# Patient Record
Sex: FEMALE | Race: WHITE | NOT HISPANIC OR LATINO | Employment: FULL TIME | URBAN - METROPOLITAN AREA
[De-identification: names, ages, dates, MRNs, and addresses within clinical notes are randomized per-mention and may not be internally consistent; named-entity substitution may affect disease eponyms.]

---

## 2017-05-10 ENCOUNTER — TRANSCRIBE ORDERS (OUTPATIENT)
Dept: ADMINISTRATIVE | Facility: HOSPITAL | Age: 72
End: 2017-05-10

## 2017-05-10 DIAGNOSIS — Z12.31 VISIT FOR SCREENING MAMMOGRAM: Primary | ICD-10-CM

## 2017-05-18 ENCOUNTER — HOSPITAL ENCOUNTER (OUTPATIENT)
Dept: MAMMOGRAPHY | Facility: HOSPITAL | Age: 72
Discharge: HOME | End: 2017-05-18
Admitting: INTERNAL MEDICINE

## 2017-05-18 DIAGNOSIS — Z12.31 VISIT FOR SCREENING MAMMOGRAM: ICD-10-CM

## 2017-05-18 PROCEDURE — 77063 BREAST TOMOSYNTHESIS BI: CPT

## 2017-05-18 PROCEDURE — G0202 SCR MAMMO BI INCL CAD: HCPCS | Performed by: RADIOLOGY

## 2017-05-18 PROCEDURE — G0202 SCR MAMMO BI INCL CAD: HCPCS

## 2017-05-18 PROCEDURE — 77063 BREAST TOMOSYNTHESIS BI: CPT | Performed by: RADIOLOGY

## 2018-03-05 ENCOUNTER — TRANSCRIBE ORDERS (OUTPATIENT)
Dept: ADMINISTRATIVE | Facility: HOSPITAL | Age: 73
End: 2018-03-05

## 2018-03-05 DIAGNOSIS — Z12.31 VISIT FOR SCREENING MAMMOGRAM: Primary | ICD-10-CM

## 2018-05-17 ENCOUNTER — OFFICE VISIT (OUTPATIENT)
Dept: NEUROSURGERY | Facility: CLINIC | Age: 73
End: 2018-05-17

## 2018-05-17 VITALS
HEIGHT: 59 IN | TEMPERATURE: 97.8 F | BODY MASS INDEX: 37.7 KG/M2 | WEIGHT: 187 LBS | DIASTOLIC BLOOD PRESSURE: 70 MMHG | SYSTOLIC BLOOD PRESSURE: 122 MMHG

## 2018-05-17 DIAGNOSIS — M48.062 SPINAL STENOSIS OF LUMBAR REGION WITH NEUROGENIC CLAUDICATION: ICD-10-CM

## 2018-05-17 DIAGNOSIS — M51.36 BULGING LUMBAR DISC: Primary | ICD-10-CM

## 2018-05-17 PROCEDURE — 99204 OFFICE O/P NEW MOD 45 MIN: CPT | Performed by: NEUROLOGICAL SURGERY

## 2018-05-17 RX ORDER — CARVEDILOL 3.12 MG/1
3.12 TABLET ORAL 2 TIMES DAILY WITH MEALS
COMMUNITY

## 2018-05-17 RX ORDER — DIPHENHYDRAMINE HCL 25 MG
25 CAPSULE ORAL EVERY 6 HOURS PRN
COMMUNITY

## 2018-05-17 RX ORDER — ASPIRIN 81 MG/1
81 TABLET ORAL DAILY
COMMUNITY
End: 2018-11-03 | Stop reason: HOSPADM

## 2018-05-17 RX ORDER — LEVOTHYROXINE SODIUM 0.1 MG/1
100 TABLET ORAL DAILY
COMMUNITY

## 2018-05-17 RX ORDER — HYDROCODONE BITARTRATE AND ACETAMINOPHEN 7.5; 325 MG/1; MG/1
1 TABLET ORAL EVERY 6 HOURS PRN
COMMUNITY
End: 2018-10-29

## 2018-05-17 RX ORDER — INSULIN LISPRO 100 [IU]/ML
20 INJECTION, SUSPENSION SUBCUTANEOUS EVERY MORNING
COMMUNITY

## 2018-05-17 RX ORDER — PRAVASTATIN SODIUM 40 MG
40 TABLET ORAL NIGHTLY
COMMUNITY

## 2018-05-17 RX ORDER — FUROSEMIDE 20 MG/1
20 TABLET ORAL DAILY
COMMUNITY

## 2018-05-17 RX ORDER — LISINOPRIL 2.5 MG/1
2.5 TABLET ORAL
COMMUNITY

## 2018-05-17 NOTE — PROGRESS NOTES
NAME: DANIELLE HARRIS   DOS: 2018  : 1945  PCP: Keshia Noe MD    Chief Complaint:  Back pain  Chief Complaint   Patient presents with   • Back Pain   • Leg Pain       History of Present Illness:  72 y.o. female   The very pleasant 72-year-old female well-known to me she had a recent fall about 2 months ago with history of back pain and bilateral abdominal symptomatology some of which is better but some which was made worse from physical therapy.  It's difficult to say if she is overall improved but she is not 100% better she reports some left-sided sciatic symptoms bilateral hip pain possibly of thoracic or lumbar pain and had intermittent incontinence during this but most of it was urgent incontinence she stated no evidence of cauda equina type of symptomatology she's here for evaluation    PMHX  Allergies:  Allergies   Allergen Reactions   • Atorvastatin Hives   • Norvasc  [Amlodipine Besylate] Other (See Comments)   • Insulin Nph Isophane & Regular Rash     Medications    Current Outpatient Prescriptions:   •  aspirin 81 MG EC tablet, Take 81 mg by mouth Daily., Disp: , Rfl:   •  carvedilol (COREG) 3.125 MG tablet, Take 3.125 mg by mouth 2 (Two) Times a Day With Meals., Disp: , Rfl:   •  diphenhydrAMINE (BENADRYL) 25 mg capsule, Take 25 mg by mouth Every 6 (Six) Hours As Needed for Itching., Disp: , Rfl:   •  furosemide (LASIX) 20 MG tablet, Take 20 mg by mouth 2 (Two) Times a Day., Disp: , Rfl:   •  HYDROcodone-acetaminophen (NORCO) 7.5-325 MG per tablet, Take 1 tablet by mouth Every 6 (Six) Hours As Needed for Moderate Pain ., Disp: , Rfl:   •  insulin lispro protamine-insulin lispro (humaLOG 50-50) (50-50) 100 UNIT/ML suspension injection, Inject  under the skin 2 (Two) Times a Day With Meals., Disp: , Rfl:   •  levothyroxine (SYNTHROID, LEVOTHROID) 100 MCG tablet, Take 100 mcg by mouth Daily., Disp: , Rfl:   •  lisinopril (PRINIVIL,ZESTRIL) 2.5 MG tablet, Take 2.5 mg by mouth Daily., Disp:  , Rfl:   •  pravastatin (PRAVACHOL) 40 MG tablet, Take 40 mg by mouth Daily., Disp: , Rfl:   Past Medical History:  Past Medical History:   Diagnosis Date   • Arthritis    • Asthma    • Diabetes mellitus    • History of transfusion    • Stroke      Past Surgical History:  Past Surgical History:   Procedure Laterality Date   • APPENDECTOMY     • CARPAL TUNNEL RELEASE     • CHOLECYSTECTOMY     • HYSTERECTOMY      AGE 54   • HYSTERECTOMY     • OOPHORECTOMY       Social Hx:  Social History   Substance Use Topics   • Smoking status: Former Smoker   • Smokeless tobacco: Never Used   • Alcohol use No     Family Hx:  Family History   Problem Relation Age of Onset   • Breast cancer Sister    • Ovarian cancer Sister    • Cancer Sister    • Cancer Mother    • No Known Problems Father      Review of Systems:        Review of Systems   Constitutional: Positive for activity change and fatigue. Negative for appetite change, chills, diaphoresis, fever and unexpected weight change.   HENT: Negative for congestion, dental problem, drooling, ear discharge, ear pain, facial swelling, hearing loss, mouth sores, nosebleeds, postnasal drip, rhinorrhea, sinus pressure, sneezing, sore throat, tinnitus, trouble swallowing and voice change.    Eyes: Negative for photophobia, pain, discharge, redness, itching and visual disturbance.   Respiratory: Negative for apnea, cough, choking, chest tightness, shortness of breath, wheezing and stridor.    Cardiovascular: Positive for leg swelling. Negative for chest pain and palpitations.   Gastrointestinal: Positive for abdominal pain. Negative for abdominal distention, anal bleeding, blood in stool, constipation, diarrhea, nausea, rectal pain and vomiting.   Endocrine: Negative for cold intolerance, heat intolerance, polydipsia, polyphagia and polyuria.   Genitourinary: Positive for urgency. Negative for decreased urine volume, difficulty urinating, dysuria, enuresis, flank pain, frequency, genital  sores and hematuria.   Musculoskeletal: Positive for arthralgias, back pain and myalgias. Negative for gait problem, joint swelling, neck pain and neck stiffness.   Skin: Negative for color change, pallor, rash and wound.   Allergic/Immunologic: Negative for environmental allergies, food allergies and immunocompromised state.   Neurological: Positive for weakness. Negative for dizziness, tremors, seizures, syncope, facial asymmetry, speech difficulty, light-headedness, numbness and headaches.   Hematological: Negative for adenopathy. Does not bruise/bleed easily.   Psychiatric/Behavioral: Negative for agitation, behavioral problems, confusion, decreased concentration, dysphoric mood, hallucinations, self-injury, sleep disturbance and suicidal ideas. The patient is not nervous/anxious and is not hyperactive.    I have reviewed this note template and all pertinent parts of the review of systems social, family history, surgical history and medication list      Physical Examination:  Vitals:    05/17/18 1014   BP: 122/70   Temp: 97.8 °F (36.6 °C)      General Appearance:   Well developed, well nourished, well groomed, alert, and cooperative.  Neurological examination:  Neurologic Exam  Vital signs were reviewed and documented in the chart  Patient appeared in good neurologic function with normal comprehension fluent speech  Mood and affect are normal  Sense of smell deferred    Pupils symmetric equally reactive funduscopic exam not visualized   Visual fields intact to confrontation  Extraocular movements intact  Face motor function is symmetric  Facial sensations normal  Hearing intact to finger rub hearing intact to finger rub  Tongue is midline  Palate symmetric  Swallowing normal  Shoulder shrug normal    Muscle bulk and tone normal  5 out of 5 strength no motor drift  Gait normal intact  Negative Romberg  No clonus long tract signs or myelopathy    Reflexes symmetric trace throughout  Mild edema noted and  extremities skin appears normal    Straight leg raise sign absent  No signs of intrinsic hip dysfunction she is tender at the knees bilaterally  Back is without any lesions or abnormality she has no percussive tenderness to speak of  Feet are warm and well perfused        Review of Imaging/DATA:  Reviewed a MRI of her lumbar spine that shows the presence of advanced degenerative changes at L4 5 there is a lot of Modicon plate changes there is a leftward disc herniation with moderate to severe lateral recess stenosis    There is also a T12 compression fracture likely that a subacute or some mild contouring of the cord up at the thoracic or lumbar area above this and multi levels of intraforaminal disease  Diagnoses/Plan:    Ms. Powell is a 72 y.o. female   Overall she has no signs of myelopathy she appears relatively comfortable she's having to take some intermittent opioids but otherwise is making her way she doesn't have flagrant radiculopathy from her desk I think she has 2 things going on I think her subacute T12 compression fracture must be improving she has no percussive tenderness she has a lot of anterior abdominal discomfort but it's difficult to ascertain whether or not that is from her injuries.  Additionally I don't like the marrow signal change in her back but again that will have to be monitored    From my standpoint I did offer a biopsy and T12 compression fracture surgery with kyphoplasty as well as lumbar discectomy at left L4 5 there may expedite her return to function    She did not have any symptoms of neurogenic claudication before her fall or a monitor her though given her modest improvement and we'll see her back with a set of lumbar x-rays

## 2018-05-21 ENCOUNTER — HOSPITAL ENCOUNTER (OUTPATIENT)
Dept: MAMMOGRAPHY | Facility: HOSPITAL | Age: 73
Discharge: HOME | End: 2018-05-21
Admitting: INTERNAL MEDICINE

## 2018-05-21 DIAGNOSIS — Z12.31 VISIT FOR SCREENING MAMMOGRAM: ICD-10-CM

## 2018-05-21 PROCEDURE — 77067 SCR MAMMO BI INCL CAD: CPT

## 2018-05-21 PROCEDURE — 77063 BREAST TOMOSYNTHESIS BI: CPT | Performed by: RADIOLOGY

## 2018-05-21 PROCEDURE — 77063 BREAST TOMOSYNTHESIS BI: CPT

## 2018-05-21 PROCEDURE — 77067 SCR MAMMO BI INCL CAD: CPT | Performed by: RADIOLOGY

## 2018-06-22 ENCOUNTER — HOSPITAL ENCOUNTER (OUTPATIENT)
Dept: GENERAL RADIOLOGY | Facility: HOSPITAL | Age: 73
Discharge: HOME | End: 2018-06-22
Attending: NEUROLOGICAL SURGERY | Admitting: NEUROLOGICAL SURGERY

## 2018-06-22 DIAGNOSIS — M48.062 SPINAL STENOSIS OF LUMBAR REGION WITH NEUROGENIC CLAUDICATION: ICD-10-CM

## 2018-06-22 DIAGNOSIS — M51.36 BULGING LUMBAR DISC: ICD-10-CM

## 2018-06-22 PROCEDURE — 72120 X-RAY BEND ONLY L-S SPINE: CPT

## 2018-06-28 ENCOUNTER — OFFICE VISIT (OUTPATIENT)
Dept: NEUROSURGERY | Facility: CLINIC | Age: 73
End: 2018-06-28

## 2018-06-28 VITALS
WEIGHT: 187 LBS | BODY MASS INDEX: 37.7 KG/M2 | DIASTOLIC BLOOD PRESSURE: 74 MMHG | SYSTOLIC BLOOD PRESSURE: 128 MMHG | TEMPERATURE: 97.8 F | HEIGHT: 59 IN

## 2018-06-28 DIAGNOSIS — M51.26 HNP (HERNIATED NUCLEUS PULPOSUS), LUMBAR: Primary | ICD-10-CM

## 2018-06-28 DIAGNOSIS — M48.062 SPINAL STENOSIS OF LUMBAR REGION WITH NEUROGENIC CLAUDICATION: ICD-10-CM

## 2018-06-28 PROCEDURE — 99214 OFFICE O/P EST MOD 30 MIN: CPT | Performed by: NEUROLOGICAL SURGERY

## 2018-06-28 NOTE — PROGRESS NOTES
NAME: DANIELLE HARRIS   DOS: 2018  : 1945  PCP: Keshia Noe MD    Chief Complaint:    Chief Complaint   Patient presents with   • Back Pain   • Leg Pain       History of Present Illness:  72 y.o. female   The very pleasant 72-year-old female well-known to me she had a recent fall about 2 months ago with history of back pain and bilateral abdominal symptomatology some of which is better but some which was made worse from physical therapy.  It's difficult to say if she is overall improved but she is not 100% better she reports some left-sided sciatic symptoms bilateral hip pain possibly of thoracic or lumbar pain and had intermittent incontinence during this but most of it was urgent incontinence she stated no evidence of cauda equina type of symptomatology she's here for evaluation       She's been participating in physical therapy and still have some symptoms of left neurogenic claudication predominantly in the left buttock hip and leg a lot of her symptoms however are circumferential referrable to the T11 area and she has a history of abdominal symptomatology as well she does occasionally have right-sided symptoms.  She denies any bowel bladder incontinence issues that are new for her some of her pain is better it's difficult to ascertain the degree of symptoms that she has regarding her left lower extremity as far as intensity she still is caring for her .    PMHX  Allergies:  Allergies   Allergen Reactions   • Atorvastatin Hives   • Norvasc  [Amlodipine Besylate] Other (See Comments)   • Insulin Nph Isophane & Regular Rash     Medications    Current Outpatient Prescriptions:   •  aspirin 81 MG EC tablet, Take 81 mg by mouth Daily., Disp: , Rfl:   •  carvedilol (COREG) 3.125 MG tablet, Take 3.125 mg by mouth 2 (Two) Times a Day With Meals., Disp: , Rfl:   •  diphenhydrAMINE (BENADRYL) 25 mg capsule, Take 25 mg by mouth Every 6 (Six) Hours As Needed for Itching., Disp: , Rfl:   •   furosemide (LASIX) 20 MG tablet, Take 20 mg by mouth 2 (Two) Times a Day., Disp: , Rfl:   •  HYDROcodone-acetaminophen (NORCO) 7.5-325 MG per tablet, Take 1 tablet by mouth Every 6 (Six) Hours As Needed for Moderate Pain ., Disp: , Rfl:   •  insulin lispro protamine-insulin lispro (humaLOG 50-50) (50-50) 100 UNIT/ML suspension injection, Inject  under the skin 2 (Two) Times a Day With Meals., Disp: , Rfl:   •  levothyroxine (SYNTHROID, LEVOTHROID) 100 MCG tablet, Take 100 mcg by mouth Daily., Disp: , Rfl:   •  lisinopril (PRINIVIL,ZESTRIL) 2.5 MG tablet, Take 2.5 mg by mouth Daily., Disp: , Rfl:   •  pravastatin (PRAVACHOL) 40 MG tablet, Take 40 mg by mouth Daily., Disp: , Rfl:   Past Medical History:  Past Medical History:   Diagnosis Date   • Arthritis    • Asthma    • Diabetes mellitus    • History of transfusion    • Stroke      Past Surgical History:  Past Surgical History:   Procedure Laterality Date   • APPENDECTOMY     • CARPAL TUNNEL RELEASE     • CHOLECYSTECTOMY     • HYSTERECTOMY      AGE 54   • HYSTERECTOMY     • OOPHORECTOMY       Social Hx:  Social History   Substance Use Topics   • Smoking status: Former Smoker   • Smokeless tobacco: Never Used   • Alcohol use No     Family Hx:  Family History   Problem Relation Age of Onset   • Breast cancer Sister 30   • Ovarian cancer Sister    • Cancer Sister    • Cancer Mother    • No Known Problems Father      Review of Systems:        Review of Systems   Constitutional: Positive for activity change and fatigue. Negative for appetite change, chills, diaphoresis, fever and unexpected weight change.   HENT: Negative for congestion, dental problem, drooling, ear discharge, ear pain, facial swelling, hearing loss, mouth sores, nosebleeds, postnasal drip, rhinorrhea, sinus pressure, sneezing, sore throat, tinnitus, trouble swallowing and voice change.    Eyes: Negative for photophobia, pain, discharge, redness, itching and visual disturbance.   Respiratory: Negative  for apnea, cough, choking, chest tightness, shortness of breath, wheezing and stridor.    Cardiovascular: Positive for leg swelling. Negative for chest pain and palpitations.   Gastrointestinal: Positive for abdominal pain. Negative for abdominal distention, anal bleeding, blood in stool, constipation, diarrhea, nausea, rectal pain and vomiting.   Endocrine: Negative for cold intolerance, heat intolerance, polydipsia, polyphagia and polyuria.   Genitourinary: Positive for urgency. Negative for decreased urine volume, difficulty urinating, dysuria, enuresis, flank pain, frequency, genital sores and hematuria.   Musculoskeletal: Positive for arthralgias, back pain and myalgias. Negative for gait problem, joint swelling, neck pain and neck stiffness.   Skin: Negative for color change, pallor, rash and wound.   Allergic/Immunologic: Negative for environmental allergies, food allergies and immunocompromised state.   Neurological: Positive for weakness. Negative for dizziness, tremors, seizures, syncope, facial asymmetry, speech difficulty, light-headedness, numbness and headaches.   Hematological: Negative for adenopathy. Does not bruise/bleed easily.   Psychiatric/Behavioral: Negative for agitation, behavioral problems, confusion, decreased concentration, dysphoric mood, hallucinations, self-injury, sleep disturbance and suicidal ideas. The patient is not nervous/anxious and is not hyperactive.         I have reviewed this note template and all pertinent parts of the review of systems social, family history, surgical history and medication list    Physical Examination:  Vitals:    06/28/18 0947   BP: 128/74   Temp: 97.8 °F (36.6 °C)      General Appearance:   Well developed, well nourished, well groomed, alert, and cooperative.  Neurological examination:  Neurologic Exam  Vital signs were reviewed and documented in the chart  Patient appeared in good neurologic function with normal comprehension fluent speech  Mood and  affect are normal  Sense of smell deferred    Pupils symmetric equally reactive funduscopic exam not visualized   Visual fields intact to confrontation  Extraocular movements intact  Face motor function is symmetric  Facial sensations normal  Hearing intact to finger rub hearing intact to finger rub  Tongue is midline  Palate symmetric  Swallowing normal  Shoulder shrug normal    Muscle bulk and tone normal  5 out of 5 strength no motor drift  Gait normal intact  Negative Romberg  No clonus long tract signs or myelopathy    Reflexes symmetric trace at the knees and ankles  No edema noted and extremities skin appears normal    Straight leg raise sign absent  Mild signs of intrinsic hip dysfunction  Back is without any lesions or abnormality  Feet are warm and well perfused  Her gait is wide-based and stable she can toe and heel walk      Review of Imaging/DATA:  I reviewed her MRI again she's got multilevel degenerative disc disease she has what I would characterize as moderate to severe left lateral recess stenosis at the L4 5 area additionally there is intraforaminal component of her disease and she has lesser disease at the 34 level her x-rays are relatively unremarkable her compression fracture seems stable  Diagnoses/Plan:    Ms. Powell is a 72 y.o. female   From my standpoint I think she still struggling it's difficult to ascertain how much of this is general loss of mobility from her complicated hip buttock knee arthritic issues are how much of it is strictly from her sciatica.  Had a nice discussion with her I think she is a candidate for an L4 5 lamina foraminotomy I do think she needs to follow-up with Dr. Huang regarding this abdominal pain is to sorted out and I think she's even may be a bit interested in getting a CT of her abdominal area at some point which she is to follow-up with her primary care doctor about.    As far as her compression fracture she doesn't really have percussive tenderness but  given the circumstantial nature of some of her upper pain it may not be unreasonable to obtain a bone scan which I'll follow-up on him in a set her up a follow-up if her bone scans hot we'll consider it kyphoplasty would lamina foraminotomy if her bone scan is not we can contemplate a lamina foraminotomy alone    Additionally I also think she should make arrangements with Dr. ochoa for a lumbar epidural steroid block to see if that will help.  It's been a pleasure to provide neurosurgical care

## 2018-07-13 ENCOUNTER — HOSPITAL ENCOUNTER (OUTPATIENT)
Dept: NUCLEAR MEDICINE | Facility: HOSPITAL | Age: 73
Discharge: HOME | End: 2018-07-13
Attending: NEUROLOGICAL SURGERY

## 2018-07-13 DIAGNOSIS — M48.062 SPINAL STENOSIS OF LUMBAR REGION WITH NEUROGENIC CLAUDICATION: ICD-10-CM

## 2018-07-13 DIAGNOSIS — M51.26 HNP (HERNIATED NUCLEUS PULPOSUS), LUMBAR: ICD-10-CM

## 2018-07-13 PROCEDURE — A9503 TC99M MEDRONATE: HCPCS | Performed by: NEUROLOGICAL SURGERY

## 2018-07-13 PROCEDURE — 78306 BONE IMAGING WHOLE BODY: CPT

## 2018-07-13 PROCEDURE — 0 TECHNETIUM MEDRONATE KIT: Performed by: NEUROLOGICAL SURGERY

## 2018-07-13 RX ORDER — TC 99M MEDRONATE 20 MG/10ML
25.5 INJECTION, POWDER, LYOPHILIZED, FOR SOLUTION INTRAVENOUS
Status: COMPLETED | OUTPATIENT
Start: 2018-07-13 | End: 2018-07-13

## 2018-07-13 RX ADMIN — Medication 25.5 MILLICURIE: at 08:10

## 2018-07-26 ENCOUNTER — OFFICE VISIT (OUTPATIENT)
Dept: NEUROSURGERY | Facility: CLINIC | Age: 73
End: 2018-07-26

## 2018-07-26 VITALS
TEMPERATURE: 98.2 F | DIASTOLIC BLOOD PRESSURE: 68 MMHG | HEIGHT: 59 IN | WEIGHT: 189 LBS | SYSTOLIC BLOOD PRESSURE: 138 MMHG | BODY MASS INDEX: 38.1 KG/M2

## 2018-07-26 DIAGNOSIS — M51.26 HNP (HERNIATED NUCLEUS PULPOSUS), LUMBAR: Primary | ICD-10-CM

## 2018-07-26 DIAGNOSIS — M51.36 DDD (DEGENERATIVE DISC DISEASE), LUMBAR: ICD-10-CM

## 2018-07-26 DIAGNOSIS — M48.062 SPINAL STENOSIS OF LUMBAR REGION WITH NEUROGENIC CLAUDICATION: Primary | ICD-10-CM

## 2018-07-26 PROCEDURE — 99213 OFFICE O/P EST LOW 20 MIN: CPT | Performed by: PHYSICIAN ASSISTANT

## 2018-07-26 NOTE — PROGRESS NOTES
Patient: Amy Powell  : 1945    Primary Care Provider: Keshia Noe MD      Chief Complaint: Low back pain, left leg sciatica    History of Present Illness:       Dr. Alcazar has seen patient recently with an MRI of the lumbar spine that showed moderate to severe foraminal stenosis at left L4 5.  He thought that this could possibly lead itself to surgery.  Patient also has a T12 compression fracture that he ordered a bone scan on.  Bone scan came out positive for a acute fracture at T12.  Patient has had this pain since March and this seems indicative of pathologic motion in the fracture segment.    Patient is here today to discuss options.  Unfortunately, Dr. Alcazar is an emergency procedure, and is unable to discuss with him at this time, but I discussed with patient.  Kyphoplasty as well as a lumbar lamina foraminotomies for the foraminal stenosis both of which have been explained and we're going to have Dr. Alcazar call the patient with ongoing plan.  Patient is comfortable with this and daughter is a nurse practitioner is also comfortable with this.      Review of Systems   Constitutional: Positive for activity change and fatigue. Negative for appetite change, chills, diaphoresis, fever and unexpected weight change.   HENT: Negative for congestion, dental problem, drooling, ear discharge, ear pain, facial swelling, hearing loss, mouth sores, nosebleeds, postnasal drip, rhinorrhea, sinus pressure, sneezing, sore throat, tinnitus, trouble swallowing and voice change.    Eyes: Negative for photophobia, pain, discharge, redness, itching and visual disturbance.   Respiratory: Negative for apnea, cough, choking, chest tightness, shortness of breath, wheezing and stridor.    Cardiovascular: Positive for leg swelling. Negative for chest pain and palpitations.   Gastrointestinal: Positive for abdominal pain. Negative for abdominal distention, anal bleeding, blood in stool, constipation, diarrhea, nausea,  rectal pain and vomiting.   Endocrine: Negative for cold intolerance, heat intolerance, polydipsia, polyphagia and polyuria.   Genitourinary: Positive for urgency. Negative for decreased urine volume, difficulty urinating, dysuria, enuresis, flank pain, frequency, genital sores and hematuria.   Musculoskeletal: Positive for arthralgias, back pain and myalgias. Negative for gait problem, joint swelling, neck pain and neck stiffness.   Skin: Negative for color change, pallor, rash and wound.   Allergic/Immunologic: Negative for environmental allergies, food allergies and immunocompromised state.   Neurological: Positive for weakness. Negative for dizziness, tremors, seizures, syncope, facial asymmetry, speech difficulty, light-headedness, numbness and headaches.   Hematological: Negative for adenopathy. Does not bruise/bleed easily.   Psychiatric/Behavioral: Negative for agitation, behavioral problems, confusion, decreased concentration, dysphoric mood, hallucinations, self-injury, sleep disturbance and suicidal ideas. The patient is not nervous/anxious and is not hyperactive.        Past Medical History:     Past Medical History:   Diagnosis Date   • Arthritis    • Asthma    • Diabetes mellitus (CMS/HCC)    • History of transfusion    • Stroke (CMS/HCC)        Family History:     Family History   Problem Relation Age of Onset   • Breast cancer Sister 30   • Ovarian cancer Sister    • Cancer Sister    • Cancer Mother    • No Known Problems Father        Social History:    reports that she has quit smoking. She has never used smokeless tobacco. She reports that she does not drink alcohol or use drugs.   SMOKING STATUS: Nonsmoker    Surgical History:     Past Surgical History:   Procedure Laterality Date   • APPENDECTOMY     • CARPAL TUNNEL RELEASE     • CHOLECYSTECTOMY     • HYSTERECTOMY      AGE 54   • HYSTERECTOMY     • OOPHORECTOMY         Allergies:   Atorvastatin; Norvasc  [amlodipine besylate]; and Insulin nph  "isophane & regular    Physical Exam:    Vital Signs:/68 (BP Location: Right arm, Patient Position: Sitting)   Temp 98.2 °F (36.8 °C) (Temporal Artery )   Ht 149.9 cm (59\")   Wt 85.7 kg (189 lb)   BMI 38.17 kg/m²    BMI: Body mass index is 38.17 kg/m².    Vital signs were reviewed and documented in the chart  Patient appeared in good neurologic function with normal comprehension fluent speech  Mood and affect are normal  Sense of smell deferred     Pupils symmetric equally reactive funduscopic exam not visualized   Visual fields intact to confrontation  Extraocular movements intact  Face motor function is symmetric  Facial sensations normal  Hearing intact to finger rub hearing intact to finger rub  Tongue is midline  Palate symmetric  Swallowing normal  Shoulder shrug normal     Muscle bulk and tone normal  5 out of 5 strength no motor drift  Gait normal intact  Negative Romberg  No clonus long tract signs or myelopathy     Reflexes symmetric trace at the knees and ankles  No edema noted and extremities skin appears normal     Straight leg raise sign absent  Mild signs of intrinsic hip dysfunction  Back is without any lesions or abnormality  Feet are warm and well perfused  Her gait is wide-based and stable she can toe and heel walk    Medical Decision Making    Data Review:   Bone scan was reviewed and showed a hot spot at T12    Diagnosis:   T12 compression fracture  Left L4 5 foraminal stenosis    Treatment Options:   Dr. Alcazar is going to call the patient with further ongoing plan.  Patient is tried pain management, physical therapy and I'll conservative medicines with no relief.    It has been a pleasure providing neurosurgical care.    Nathan Cabral PA-C      No diagnosis found.  "

## 2018-08-02 ENCOUNTER — APPOINTMENT (OUTPATIENT)
Dept: MRI IMAGING | Facility: HOSPITAL | Age: 73
End: 2018-08-02
Attending: NEUROLOGICAL SURGERY

## 2018-08-07 ENCOUNTER — HOSPITAL ENCOUNTER (OUTPATIENT)
Dept: MRI IMAGING | Facility: HOSPITAL | Age: 73
Discharge: HOME | End: 2018-08-07
Attending: NEUROLOGICAL SURGERY | Admitting: NEUROLOGICAL SURGERY

## 2018-08-07 ENCOUNTER — HOSPITAL ENCOUNTER (OUTPATIENT)
Dept: GENERAL RADIOLOGY | Facility: HOSPITAL | Age: 73
Discharge: HOME | End: 2018-08-07
Attending: NEUROLOGICAL SURGERY

## 2018-08-07 DIAGNOSIS — M51.36 DDD (DEGENERATIVE DISC DISEASE), LUMBAR: ICD-10-CM

## 2018-08-07 DIAGNOSIS — M51.26 HNP (HERNIATED NUCLEUS PULPOSUS), LUMBAR: ICD-10-CM

## 2018-08-07 PROCEDURE — 72158 MRI LUMBAR SPINE W/O & W/DYE: CPT

## 2018-08-07 PROCEDURE — 0 GADOBENATE DIMEGLUMINE 529 MG/ML SOLUTION: Performed by: NEUROLOGICAL SURGERY

## 2018-08-07 PROCEDURE — 72120 X-RAY BEND ONLY L-S SPINE: CPT

## 2018-08-07 PROCEDURE — 82565 ASSAY OF CREATININE: CPT

## 2018-08-07 PROCEDURE — A9577 INJ MULTIHANCE: HCPCS | Performed by: NEUROLOGICAL SURGERY

## 2018-08-07 RX ADMIN — GADOBENATE DIMEGLUMINE 16 ML: 529 INJECTION, SOLUTION INTRAVENOUS at 09:52

## 2018-08-08 LAB — CREAT BLDA-MCNC: 1 MG/DL (ref 0.6–1.3)

## 2018-08-13 ENCOUNTER — PREP FOR SURGERY (OUTPATIENT)
Dept: OTHER | Facility: HOSPITAL | Age: 73
End: 2018-08-13

## 2018-08-13 ENCOUNTER — OFFICE VISIT (OUTPATIENT)
Dept: NEUROSURGERY | Facility: CLINIC | Age: 73
End: 2018-08-13

## 2018-08-13 VITALS — OXYGEN SATURATION: 98 % | BODY MASS INDEX: 37.9 KG/M2 | WEIGHT: 188 LBS | RESPIRATION RATE: 18 BRPM | HEIGHT: 59 IN

## 2018-08-13 DIAGNOSIS — S22.080A T12 COMPRESSION FRACTURE (HCC): Primary | ICD-10-CM

## 2018-08-13 DIAGNOSIS — M48.062 SPINAL STENOSIS, LUMBAR REGION, WITH NEUROGENIC CLAUDICATION: Primary | ICD-10-CM

## 2018-08-13 DIAGNOSIS — Z41.9 SURGERY, ELECTIVE: ICD-10-CM

## 2018-08-13 DIAGNOSIS — M51.36 DDD (DEGENERATIVE DISC DISEASE), LUMBAR: ICD-10-CM

## 2018-08-13 DIAGNOSIS — M51.26 HNP (HERNIATED NUCLEUS PULPOSUS), LUMBAR: ICD-10-CM

## 2018-08-13 PROCEDURE — 99214 OFFICE O/P EST MOD 30 MIN: CPT | Performed by: NEUROLOGICAL SURGERY

## 2018-08-13 RX ORDER — FAMOTIDINE 20 MG/1
20 TABLET, FILM COATED ORAL
Status: CANCELLED | OUTPATIENT
Start: 2018-08-13

## 2018-08-13 RX ORDER — CHLORHEXIDINE GLUCONATE 4 G/100ML
SOLUTION TOPICAL
Qty: 120 ML | Refills: 0 | Status: ON HOLD | OUTPATIENT
Start: 2018-08-13 | End: 2018-11-02

## 2018-08-13 RX ORDER — SODIUM CHLORIDE 0.9 % (FLUSH) 0.9 %
1-10 SYRINGE (ML) INJECTION AS NEEDED
Status: CANCELLED | OUTPATIENT
Start: 2018-08-13

## 2018-08-13 RX ORDER — HYDROCODONE BITARTRATE AND ACETAMINOPHEN 7.5; 325 MG/1; MG/1
1 TABLET ORAL ONCE
Status: CANCELLED | OUTPATIENT
Start: 2018-08-13 | End: 2018-08-13

## 2018-08-13 RX ORDER — CEFAZOLIN SODIUM 2 G/100ML
2 INJECTION, SOLUTION INTRAVENOUS ONCE
Status: CANCELLED | OUTPATIENT
Start: 2018-08-13 | End: 2018-08-13

## 2018-08-13 RX ORDER — IBUPROFEN 800 MG/1
800 TABLET ORAL ONCE
Status: CANCELLED | OUTPATIENT
Start: 2018-08-13 | End: 2018-08-13

## 2018-08-13 RX ORDER — ACETAMINOPHEN 325 MG/1
650 TABLET ORAL ONCE
Status: CANCELLED | OUTPATIENT
Start: 2018-08-13 | End: 2018-08-13

## 2018-08-13 NOTE — PROGRESS NOTES
NAME: DANIELLE HARRIS   DOS: 2018  : 1945  PCP: Keshia Noe MD    Chief Complaint:    Chief Complaint   Patient presents with   • Back Pain       History of Present Illness:  73 y.o. female   The very pleasant 72-year-old female well-known to me she had a recent fall about 2 months ago with history of back pain and bilateral abdominal symptomatology some of which is better but some which was made worse from physical therapy.  It's difficult to say if she is overall improved but she is not 100% better she reports some left-sided sciatic symptoms bilateral hip pain possibly of thoracic or lumbar pain and had intermittent incontinence during this but most of it was urgent incontinence she stated no evidence of cauda equina type of symptomatology she's here for evaluation        She's been participating in physical therapy and still have some symptoms of left neurogenic claudication predominantly in the left buttock hip and leg a lot of her symptoms however are circumferential referrable to the T11 area and she has a history of abdominal symptomatology as well she does occasionally have right-sided symptoms.  She denies any bowel bladder incontinence issues that are new for her some of her pain is better it's difficult to ascertain the degree of symptoms that she has regarding her left lower extremity as far as intensity she still is caring for her .    She is still no better she denies any bowel bladder incontinence issues she has predominantly left leg but some right hip pain    PMHX  Allergies:  Allergies   Allergen Reactions   • Atorvastatin Hives   • Norvasc  [Amlodipine Besylate] Other (See Comments)   • Insulin Nph Isophane & Regular Rash     Medications    Current Outpatient Prescriptions:   •  aspirin 81 MG EC tablet, Take 81 mg by mouth Daily., Disp: , Rfl:   •  carvedilol (COREG) 3.125 MG tablet, Take 3.125 mg by mouth 2 (Two) Times a Day With Meals., Disp: , Rfl:   •   diphenhydrAMINE (BENADRYL) 25 mg capsule, Take 25 mg by mouth Every 6 (Six) Hours As Needed for Itching., Disp: , Rfl:   •  furosemide (LASIX) 20 MG tablet, Take 20 mg by mouth 2 (Two) Times a Day., Disp: , Rfl:   •  HYDROcodone-acetaminophen (NORCO) 7.5-325 MG per tablet, Take 1 tablet by mouth Every 6 (Six) Hours As Needed for Moderate Pain ., Disp: , Rfl:   •  insulin lispro protamine-insulin lispro (humaLOG 50-50) (50-50) 100 UNIT/ML suspension injection, Inject  under the skin 2 (Two) Times a Day With Meals., Disp: , Rfl:   •  levothyroxine (SYNTHROID, LEVOTHROID) 100 MCG tablet, Take 100 mcg by mouth Daily., Disp: , Rfl:   •  lisinopril (PRINIVIL,ZESTRIL) 2.5 MG tablet, Take 2.5 mg by mouth Daily., Disp: , Rfl:   •  pravastatin (PRAVACHOL) 40 MG tablet, Take 40 mg by mouth Daily., Disp: , Rfl:   Past Medical History:  Past Medical History:   Diagnosis Date   • Arthritis    • Asthma    • Diabetes mellitus (CMS/HCC)    • History of transfusion    • Stroke (CMS/HCC)      Past Surgical History:  Past Surgical History:   Procedure Laterality Date   • APPENDECTOMY     • CARPAL TUNNEL RELEASE     • CHOLECYSTECTOMY     • HYSTERECTOMY      AGE 54   • HYSTERECTOMY     • OOPHORECTOMY       Social Hx:  Social History   Substance Use Topics   • Smoking status: Former Smoker   • Smokeless tobacco: Never Used   • Alcohol use No     Family Hx:  Family History   Problem Relation Age of Onset   • Breast cancer Sister 30   • Ovarian cancer Sister    • Cancer Sister    • Cancer Mother    • No Known Problems Father      Review of Systems:        Review of Systems   Constitutional: Negative for activity change, appetite change, chills, diaphoresis, fatigue, fever and unexpected weight change.   HENT: Negative for congestion, dental problem, drooling, ear discharge, ear pain, facial swelling, hearing loss, mouth sores, nosebleeds, postnasal drip, rhinorrhea, sinus pressure, sneezing, sore throat, tinnitus, trouble swallowing and  voice change.    Eyes: Negative for photophobia, pain, discharge, redness, itching and visual disturbance.   Respiratory: Negative for apnea, cough, choking, chest tightness, shortness of breath, wheezing and stridor.    Cardiovascular: Negative for chest pain, palpitations and leg swelling.   Gastrointestinal: Negative for abdominal distention, abdominal pain, anal bleeding, blood in stool, constipation, diarrhea, nausea, rectal pain and vomiting.   Endocrine: Negative for cold intolerance, heat intolerance, polydipsia, polyphagia and polyuria.   Genitourinary: Negative for decreased urine volume, difficulty urinating, dysuria, enuresis, flank pain, frequency, genital sores, hematuria and urgency.   Musculoskeletal: Positive for arthralgias, back pain and myalgias. Negative for gait problem, joint swelling, neck pain and neck stiffness.   Skin: Negative for color change, pallor, rash and wound.   Allergic/Immunologic: Negative for environmental allergies, food allergies and immunocompromised state.   Neurological: Negative for dizziness, tremors, seizures, syncope, facial asymmetry, speech difficulty, weakness, light-headedness, numbness and headaches.   Hematological: Negative for adenopathy. Does not bruise/bleed easily.   Psychiatric/Behavioral: Negative for agitation, behavioral problems, confusion, decreased concentration, dysphoric mood, hallucinations, self-injury, sleep disturbance and suicidal ideas. The patient is not nervous/anxious and is not hyperactive.    All other systems reviewed and are negative.       I have reviewed this note template and all pertinent parts of the review of systems social, family history, surgical history and medication list    Physical Examination:  Vitals:    08/13/18 1257   Resp: 18   SpO2: 98%      General Appearance:   Well developed, well nourished, well groomed, alert, and cooperative.  Neurological examination:  Neurologic Exam  She is awake alert and follows  commands    Cranial nerves intact    No motor drift good strength upper extremities no long track signs    No suspended thoracic sensory level    Good strength lower extremities proximally can toe and heel walk    Review of Imaging/DATA:  Reviewed her MRI therefore things essentially going on her old MRI was cut off at the T9 10 area it shows a modest disc herniation with osteophyte complex posteriorly the spinal cord is deflected but without significant signal change it could however be a nidus from one her fall occurred that she may have bruised her spinal cord    I would treat this nonsurgically right now    She has a T12 compression fracture    She has severe lateral recess stenosis L3 4 L4 5 with intraforaminal disease on the left  Diagnoses/Plan:    Ms. Powell is a 73 y.o. female   She is very complex mobility issues of recommended surgery I like to get a T12 kyphoplasty performed I expand the risks benefits of this I also think it's prudent to do a left-sided approach L3 4 L4 5 laminectomy and lamina foraminotomy discectomy she has a lot of intraforaminal disease this may not work in the sense that she may require a fusion but it represents a minimally invasive option.    I would monitor the T9 10 disc disease issue if all these other options fail we can reimage this and consider appropriate treatment.

## 2018-08-29 PROBLEM — M48.062 SPINAL STENOSIS, LUMBAR REGION, WITH NEUROGENIC CLAUDICATION: Status: ACTIVE | Noted: 2018-08-29

## 2018-10-05 ENCOUNTER — APPOINTMENT (OUTPATIENT)
Dept: PREADMISSION TESTING | Facility: HOSPITAL | Age: 73
End: 2018-10-05

## 2018-10-29 ENCOUNTER — APPOINTMENT (OUTPATIENT)
Dept: PREADMISSION TESTING | Facility: HOSPITAL | Age: 73
End: 2018-10-29

## 2018-10-29 VITALS — WEIGHT: 188.49 LBS | HEIGHT: 59 IN | BODY MASS INDEX: 38 KG/M2

## 2018-10-29 DIAGNOSIS — M48.062 SPINAL STENOSIS, LUMBAR REGION, WITH NEUROGENIC CLAUDICATION: ICD-10-CM

## 2018-10-29 LAB
ANION GAP SERPL CALCULATED.3IONS-SCNC: 7 MMOL/L (ref 3–11)
BUN BLD-MCNC: 15 MG/DL (ref 9–23)
BUN/CREAT SERPL: 16 (ref 7–25)
CALCIUM SPEC-SCNC: 8.9 MG/DL (ref 8.7–10.4)
CHLORIDE SERPL-SCNC: 106 MMOL/L (ref 99–109)
CO2 SERPL-SCNC: 28 MMOL/L (ref 20–31)
CREAT BLD-MCNC: 0.94 MG/DL (ref 0.6–1.3)
DEPRECATED RDW RBC AUTO: 47.3 FL (ref 37–54)
ERYTHROCYTE [DISTWIDTH] IN BLOOD BY AUTOMATED COUNT: 14 % (ref 11.3–14.5)
GFR SERPL CREATININE-BSD FRML MDRD: 59 ML/MIN/1.73
GLUCOSE BLD-MCNC: 198 MG/DL (ref 70–100)
HBA1C MFR BLD: 7.6 % (ref 4.8–5.6)
HCT VFR BLD AUTO: 37.7 % (ref 34.5–44)
HGB BLD-MCNC: 12.3 G/DL (ref 11.5–15.5)
MCH RBC QN AUTO: 30.4 PG (ref 27–31)
MCHC RBC AUTO-ENTMCNC: 32.6 G/DL (ref 32–36)
MCV RBC AUTO: 93.1 FL (ref 80–99)
PLATELET # BLD AUTO: 192 10*3/MM3 (ref 150–450)
PMV BLD AUTO: 10.3 FL (ref 6–12)
POTASSIUM BLD-SCNC: 3.7 MMOL/L (ref 3.5–5.5)
RBC # BLD AUTO: 4.05 10*6/MM3 (ref 3.89–5.14)
SODIUM BLD-SCNC: 141 MMOL/L (ref 132–146)
WBC NRBC COR # BLD: 7.57 10*3/MM3 (ref 3.5–10.8)

## 2018-10-29 PROCEDURE — 87081 CULTURE SCREEN ONLY: CPT | Performed by: ANESTHESIOLOGY

## 2018-10-29 PROCEDURE — 83036 HEMOGLOBIN GLYCOSYLATED A1C: CPT | Performed by: ANESTHESIOLOGY

## 2018-10-29 PROCEDURE — 80048 BASIC METABOLIC PNL TOTAL CA: CPT | Performed by: NEUROLOGICAL SURGERY

## 2018-10-29 PROCEDURE — 36415 COLL VENOUS BLD VENIPUNCTURE: CPT

## 2018-10-29 PROCEDURE — 85027 COMPLETE CBC AUTOMATED: CPT | Performed by: ANESTHESIOLOGY

## 2018-10-29 RX ORDER — INSULIN LISPRO 100 [IU]/ML
30 INJECTION, SUSPENSION SUBCUTANEOUS EVERY EVENING
COMMUNITY

## 2018-10-31 LAB — MRSA SPEC QL CULT: NORMAL

## 2018-11-02 ENCOUNTER — ANESTHESIA EVENT (OUTPATIENT)
Dept: PERIOP | Facility: HOSPITAL | Age: 73
End: 2018-11-02

## 2018-11-02 ENCOUNTER — APPOINTMENT (OUTPATIENT)
Dept: GENERAL RADIOLOGY | Facility: HOSPITAL | Age: 73
End: 2018-11-02

## 2018-11-02 ENCOUNTER — HOSPITAL ENCOUNTER (OUTPATIENT)
Facility: HOSPITAL | Age: 73
LOS: 1 days | Discharge: HOME | End: 2018-11-03
Attending: NEUROLOGICAL SURGERY | Admitting: NEUROLOGICAL SURGERY

## 2018-11-02 ENCOUNTER — ANESTHESIA (OUTPATIENT)
Dept: PERIOP | Facility: HOSPITAL | Age: 73
End: 2018-11-02

## 2018-11-02 DIAGNOSIS — M48.062 SPINAL STENOSIS, LUMBAR REGION, WITH NEUROGENIC CLAUDICATION: ICD-10-CM

## 2018-11-02 DIAGNOSIS — Z74.09 IMPAIRED FUNCTIONAL MOBILITY, BALANCE, GAIT, AND ENDURANCE: Primary | ICD-10-CM

## 2018-11-02 PROBLEM — G95.19 NEUROGENIC CLAUDICATION: Status: ACTIVE | Noted: 2018-11-02

## 2018-11-02 LAB
GLUCOSE BLDC GLUCOMTR-MCNC: 137 MG/DL (ref 70–130)
GLUCOSE BLDC GLUCOMTR-MCNC: 152 MG/DL (ref 70–130)
GLUCOSE BLDC GLUCOMTR-MCNC: 161 MG/DL (ref 70–130)
POTASSIUM BLDA-SCNC: 3.79 MMOL/L (ref 3.5–5.3)

## 2018-11-02 PROCEDURE — A9270 NON-COVERED ITEM OR SERVICE: HCPCS | Performed by: NEUROLOGICAL SURGERY

## 2018-11-02 PROCEDURE — 63710000001 HYDROCODONE-ACETAMINOPHEN 5-325 MG TABLET: Performed by: NEUROLOGICAL SURGERY

## 2018-11-02 PROCEDURE — 22510 PERQ CERVICOTHORACIC INJECT: CPT

## 2018-11-02 PROCEDURE — 76000 FLUOROSCOPY <1 HR PHYS/QHP: CPT

## 2018-11-02 PROCEDURE — 88307 TISSUE EXAM BY PATHOLOGIST: CPT | Performed by: NEUROLOGICAL SURGERY

## 2018-11-02 PROCEDURE — 25010000002 NEOSTIGMINE 10 MG/10ML SOLUTION: Performed by: NURSE ANESTHETIST, CERTIFIED REGISTERED

## 2018-11-02 PROCEDURE — 82962 GLUCOSE BLOOD TEST: CPT

## 2018-11-02 PROCEDURE — 22513 PERQ VERTEBRAL AUGMENTATION: CPT | Performed by: NEUROLOGICAL SURGERY

## 2018-11-02 PROCEDURE — 25010000003 CEFAZOLIN IN DEXTROSE 2-4 GM/100ML-% SOLUTION: Performed by: NEUROLOGICAL SURGERY

## 2018-11-02 PROCEDURE — 88311 DECALCIFY TISSUE: CPT | Performed by: NEUROLOGICAL SURGERY

## 2018-11-02 PROCEDURE — 63030 LAMOT DCMPRN NRV RT 1 LMBR: CPT | Performed by: NEUROLOGICAL SURGERY

## 2018-11-02 PROCEDURE — 63710000001 DIPHENHYDRAMINE PER 50 MG: Performed by: NEUROLOGICAL SURGERY

## 2018-11-02 PROCEDURE — 63035 LAMOT DCMPRN NRV RT EA ADDL: CPT | Performed by: NEUROLOGICAL SURGERY

## 2018-11-02 PROCEDURE — C1713 ANCHOR/SCREW BN/BN,TIS/BN: HCPCS | Performed by: NEUROLOGICAL SURGERY

## 2018-11-02 PROCEDURE — 84132 ASSAY OF SERUM POTASSIUM: CPT | Performed by: NEUROLOGICAL SURGERY

## 2018-11-02 PROCEDURE — 63710000001 CARVEDILOL 3.125 MG TABLET: Performed by: NEUROLOGICAL SURGERY

## 2018-11-02 PROCEDURE — 25010000002 FENTANYL CITRATE (PF) 100 MCG/2ML SOLUTION: Performed by: NURSE ANESTHETIST, CERTIFIED REGISTERED

## 2018-11-02 PROCEDURE — 63710000001 PRAVASTATIN 40 MG TABLET: Performed by: NEUROLOGICAL SURGERY

## 2018-11-02 PROCEDURE — 25010000002 PROPOFOL 10 MG/ML EMULSION: Performed by: NURSE ANESTHETIST, CERTIFIED REGISTERED

## 2018-11-02 PROCEDURE — 25010000002 ONDANSETRON PER 1 MG: Performed by: NURSE ANESTHETIST, CERTIFIED REGISTERED

## 2018-11-02 PROCEDURE — 0 IOPAMIDOL 41 % SOLUTION: Performed by: NEUROLOGICAL SURGERY

## 2018-11-02 DEVICE — WAX BONE HEMO AESCULAP 2.5GM: Type: IMPLANTABLE DEVICE | Site: SPINE LUMBAR | Status: FUNCTIONAL

## 2018-11-02 DEVICE — BONE CEMENT CX01B KYPHON XPEDE W MXR US
Type: IMPLANTABLE DEVICE | Site: SPINE LUMBAR | Status: FUNCTIONAL
Brand: KYPHON® XPEDE™ BONE CEMENT AND KYPHON® MIXER PACK

## 2018-11-02 RX ORDER — DIPHENHYDRAMINE HCL 25 MG
25 CAPSULE ORAL EVERY 6 HOURS PRN
Status: DISCONTINUED | OUTPATIENT
Start: 2018-11-02 | End: 2018-11-03 | Stop reason: HOSPADM

## 2018-11-02 RX ORDER — HYDROCODONE BITARTRATE AND ACETAMINOPHEN 7.5; 325 MG/1; MG/1
1 TABLET ORAL ONCE
Status: COMPLETED | OUTPATIENT
Start: 2018-11-02 | End: 2018-11-02

## 2018-11-02 RX ORDER — HYDROMORPHONE HYDROCHLORIDE 1 MG/ML
0.5 INJECTION, SOLUTION INTRAMUSCULAR; INTRAVENOUS; SUBCUTANEOUS
Status: DISCONTINUED | OUTPATIENT
Start: 2018-11-02 | End: 2018-11-02

## 2018-11-02 RX ORDER — PROPOFOL 10 MG/ML
VIAL (ML) INTRAVENOUS AS NEEDED
Status: DISCONTINUED | OUTPATIENT
Start: 2018-11-02 | End: 2018-11-02 | Stop reason: SURG

## 2018-11-02 RX ORDER — FENTANYL CITRATE 50 UG/ML
INJECTION, SOLUTION INTRAMUSCULAR; INTRAVENOUS AS NEEDED
Status: DISCONTINUED | OUTPATIENT
Start: 2018-11-02 | End: 2018-11-02 | Stop reason: SURG

## 2018-11-02 RX ORDER — SODIUM CHLORIDE 0.9 % (FLUSH) 0.9 %
3-10 SYRINGE (ML) INJECTION AS NEEDED
Status: DISCONTINUED | OUTPATIENT
Start: 2018-11-02 | End: 2018-11-02 | Stop reason: HOSPADM

## 2018-11-02 RX ORDER — PROMETHAZINE HYDROCHLORIDE 25 MG/ML
6.25 INJECTION, SOLUTION INTRAMUSCULAR; INTRAVENOUS
Status: DISCONTINUED | OUTPATIENT
Start: 2018-11-02 | End: 2018-11-02 | Stop reason: HOSPADM

## 2018-11-02 RX ORDER — ACETAMINOPHEN 325 MG/1
650 TABLET ORAL ONCE
Status: COMPLETED | OUTPATIENT
Start: 2018-11-02 | End: 2018-11-02

## 2018-11-02 RX ORDER — CEFAZOLIN SODIUM 2 G/100ML
2 INJECTION, SOLUTION INTRAVENOUS ONCE
Status: COMPLETED | OUTPATIENT
Start: 2018-11-02 | End: 2018-11-02

## 2018-11-02 RX ORDER — CEFAZOLIN SODIUM 2 G/100ML
2 INJECTION, SOLUTION INTRAVENOUS EVERY 8 HOURS
Status: COMPLETED | OUTPATIENT
Start: 2018-11-02 | End: 2018-11-03

## 2018-11-02 RX ORDER — LIDOCAINE HYDROCHLORIDE AND EPINEPHRINE 5; 5 MG/ML; UG/ML
INJECTION, SOLUTION INFILTRATION; PERINEURAL AS NEEDED
Status: DISCONTINUED | OUTPATIENT
Start: 2018-11-02 | End: 2018-11-03 | Stop reason: HOSPADM

## 2018-11-02 RX ORDER — CARVEDILOL 3.12 MG/1
3.12 TABLET ORAL 2 TIMES DAILY WITH MEALS
Status: DISCONTINUED | OUTPATIENT
Start: 2018-11-02 | End: 2018-11-03 | Stop reason: HOSPADM

## 2018-11-02 RX ORDER — SODIUM CHLORIDE 0.9 % (FLUSH) 0.9 %
3 SYRINGE (ML) INJECTION EVERY 12 HOURS SCHEDULED
Status: DISCONTINUED | OUTPATIENT
Start: 2018-11-02 | End: 2018-11-02 | Stop reason: HOSPADM

## 2018-11-02 RX ORDER — SODIUM CHLORIDE, SODIUM LACTATE, POTASSIUM CHLORIDE, CALCIUM CHLORIDE 600; 310; 30; 20 MG/100ML; MG/100ML; MG/100ML; MG/100ML
9 INJECTION, SOLUTION INTRAVENOUS CONTINUOUS PRN
Status: DISCONTINUED | OUTPATIENT
Start: 2018-11-02 | End: 2018-11-03 | Stop reason: HOSPADM

## 2018-11-02 RX ORDER — GLYCOPYRROLATE 0.2 MG/ML
INJECTION INTRAMUSCULAR; INTRAVENOUS AS NEEDED
Status: DISCONTINUED | OUTPATIENT
Start: 2018-11-02 | End: 2018-11-02 | Stop reason: SURG

## 2018-11-02 RX ORDER — ONDANSETRON 2 MG/ML
INJECTION INTRAMUSCULAR; INTRAVENOUS AS NEEDED
Status: DISCONTINUED | OUTPATIENT
Start: 2018-11-02 | End: 2018-11-02 | Stop reason: SURG

## 2018-11-02 RX ORDER — IBUPROFEN 800 MG/1
800 TABLET ORAL ONCE
Status: COMPLETED | OUTPATIENT
Start: 2018-11-02 | End: 2018-11-02

## 2018-11-02 RX ORDER — LIDOCAINE HYDROCHLORIDE 10 MG/ML
0.5 INJECTION, SOLUTION EPIDURAL; INFILTRATION; INTRACAUDAL; PERINEURAL ONCE AS NEEDED
Status: COMPLETED | OUTPATIENT
Start: 2018-11-02 | End: 2018-11-02

## 2018-11-02 RX ORDER — SODIUM CHLORIDE 0.9 % (FLUSH) 0.9 %
3-10 SYRINGE (ML) INJECTION AS NEEDED
Status: DISCONTINUED | OUTPATIENT
Start: 2018-11-02 | End: 2018-11-03 | Stop reason: HOSPADM

## 2018-11-02 RX ORDER — ASPIRIN 81 MG/1
81 TABLET ORAL DAILY
Status: DISCONTINUED | OUTPATIENT
Start: 2018-11-03 | End: 2018-11-03 | Stop reason: HOSPADM

## 2018-11-02 RX ORDER — ACETAMINOPHEN 325 MG/1
650 TABLET ORAL EVERY 4 HOURS PRN
Status: DISCONTINUED | OUTPATIENT
Start: 2018-11-02 | End: 2018-11-03 | Stop reason: HOSPADM

## 2018-11-02 RX ORDER — LISINOPRIL 5 MG/1
2.5 TABLET ORAL NIGHTLY
Status: DISCONTINUED | OUTPATIENT
Start: 2018-11-02 | End: 2018-11-03 | Stop reason: HOSPADM

## 2018-11-02 RX ORDER — HYDROCODONE BITARTRATE AND ACETAMINOPHEN 5; 325 MG/1; MG/1
1 TABLET ORAL EVERY 4 HOURS PRN
Qty: 35 TABLET | Refills: 0 | Status: SHIPPED | OUTPATIENT
Start: 2018-11-02 | End: 2018-11-12

## 2018-11-02 RX ORDER — NALOXONE HCL 0.4 MG/ML
0.4 VIAL (ML) INJECTION
Status: DISCONTINUED | OUTPATIENT
Start: 2018-11-02 | End: 2018-11-03 | Stop reason: HOSPADM

## 2018-11-02 RX ORDER — PRAVASTATIN SODIUM 40 MG
40 TABLET ORAL NIGHTLY
Status: DISCONTINUED | OUTPATIENT
Start: 2018-11-02 | End: 2018-11-03 | Stop reason: HOSPADM

## 2018-11-02 RX ORDER — NEOSTIGMINE METHYLSULFATE 1 MG/ML
INJECTION, SOLUTION INTRAVENOUS AS NEEDED
Status: DISCONTINUED | OUTPATIENT
Start: 2018-11-02 | End: 2018-11-02 | Stop reason: SURG

## 2018-11-02 RX ORDER — HYDROCODONE BITARTRATE AND ACETAMINOPHEN 5; 325 MG/1; MG/1
2 TABLET ORAL EVERY 4 HOURS PRN
Status: DISCONTINUED | OUTPATIENT
Start: 2018-11-02 | End: 2018-11-03 | Stop reason: HOSPADM

## 2018-11-02 RX ORDER — MAGNESIUM HYDROXIDE 1200 MG/15ML
LIQUID ORAL AS NEEDED
Status: DISCONTINUED | OUTPATIENT
Start: 2018-11-02 | End: 2018-11-02 | Stop reason: HOSPADM

## 2018-11-02 RX ORDER — FUROSEMIDE 40 MG/1
20 TABLET ORAL DAILY
Status: DISCONTINUED | OUTPATIENT
Start: 2018-11-03 | End: 2018-11-03 | Stop reason: HOSPADM

## 2018-11-02 RX ORDER — FENTANYL CITRATE 50 UG/ML
50 INJECTION, SOLUTION INTRAMUSCULAR; INTRAVENOUS
Status: DISCONTINUED | OUTPATIENT
Start: 2018-11-02 | End: 2018-11-02

## 2018-11-02 RX ORDER — LEVOTHYROXINE SODIUM 0.1 MG/1
100 TABLET ORAL
Status: DISCONTINUED | OUTPATIENT
Start: 2018-11-03 | End: 2018-11-03 | Stop reason: HOSPADM

## 2018-11-02 RX ORDER — FAMOTIDINE 20 MG/1
20 TABLET, FILM COATED ORAL
Status: COMPLETED | OUTPATIENT
Start: 2018-11-02 | End: 2018-11-02

## 2018-11-02 RX ORDER — ROCURONIUM BROMIDE 10 MG/ML
INJECTION, SOLUTION INTRAVENOUS AS NEEDED
Status: DISCONTINUED | OUTPATIENT
Start: 2018-11-02 | End: 2018-11-02 | Stop reason: SURG

## 2018-11-02 RX ORDER — BUPIVACAINE HYDROCHLORIDE 2.5 MG/ML
INJECTION, SOLUTION EPIDURAL; INFILTRATION; INTRACAUDAL AS NEEDED
Status: DISCONTINUED | OUTPATIENT
Start: 2018-11-02 | End: 2018-11-02 | Stop reason: HOSPADM

## 2018-11-02 RX ORDER — SODIUM CHLORIDE 0.9 % (FLUSH) 0.9 %
3 SYRINGE (ML) INJECTION EVERY 12 HOURS SCHEDULED
Status: DISCONTINUED | OUTPATIENT
Start: 2018-11-02 | End: 2018-11-03 | Stop reason: HOSPADM

## 2018-11-02 RX ORDER — SODIUM CHLORIDE 0.9 % (FLUSH) 0.9 %
1-10 SYRINGE (ML) INJECTION AS NEEDED
Status: DISCONTINUED | OUTPATIENT
Start: 2018-11-02 | End: 2018-11-02 | Stop reason: HOSPADM

## 2018-11-02 RX ORDER — ONDANSETRON 2 MG/ML
4 INJECTION INTRAMUSCULAR; INTRAVENOUS ONCE
Status: DISCONTINUED | OUTPATIENT
Start: 2018-11-02 | End: 2018-11-02 | Stop reason: HOSPADM

## 2018-11-02 RX ORDER — TEMAZEPAM 15 MG/1
15 CAPSULE ORAL NIGHTLY PRN
Status: DISCONTINUED | OUTPATIENT
Start: 2018-11-02 | End: 2018-11-03 | Stop reason: HOSPADM

## 2018-11-02 RX ORDER — LIDOCAINE HYDROCHLORIDE 10 MG/ML
INJECTION, SOLUTION EPIDURAL; INFILTRATION; INTRACAUDAL; PERINEURAL AS NEEDED
Status: DISCONTINUED | OUTPATIENT
Start: 2018-11-02 | End: 2018-11-02 | Stop reason: SURG

## 2018-11-02 RX ADMIN — PROPOFOL 200 MG: 10 INJECTION, EMULSION INTRAVENOUS at 14:05

## 2018-11-02 RX ADMIN — CARVEDILOL 3.12 MG: 3.12 TABLET, FILM COATED ORAL at 18:22

## 2018-11-02 RX ADMIN — IBUPROFEN 800 MG: 800 TABLET ORAL at 12:05

## 2018-11-02 RX ADMIN — SODIUM CHLORIDE, POTASSIUM CHLORIDE, SODIUM LACTATE AND CALCIUM CHLORIDE: 600; 310; 30; 20 INJECTION, SOLUTION INTRAVENOUS at 15:32

## 2018-11-02 RX ADMIN — DIPHENHYDRAMINE HYDROCHLORIDE 25 MG: 25 CAPSULE ORAL at 21:16

## 2018-11-02 RX ADMIN — FENTANYL CITRATE 50 MCG: 50 INJECTION, SOLUTION INTRAMUSCULAR; INTRAVENOUS at 14:05

## 2018-11-02 RX ADMIN — PRAVASTATIN SODIUM 40 MG: 40 TABLET ORAL at 21:16

## 2018-11-02 RX ADMIN — HYDROCODONE BITARTRATE AND ACETAMINOPHEN 2 TABLET: 5; 325 TABLET ORAL at 18:49

## 2018-11-02 RX ADMIN — FENTANYL CITRATE 50 MCG: 50 INJECTION, SOLUTION INTRAMUSCULAR; INTRAVENOUS at 14:28

## 2018-11-02 RX ADMIN — ROCURONIUM BROMIDE 50 MG: 10 SOLUTION INTRAVENOUS at 14:05

## 2018-11-02 RX ADMIN — ONDANSETRON 4 MG: 2 INJECTION INTRAMUSCULAR; INTRAVENOUS at 15:50

## 2018-11-02 RX ADMIN — EPHEDRINE SULFATE 10 MG: 50 INJECTION INTRAMUSCULAR; INTRAVENOUS; SUBCUTANEOUS at 15:46

## 2018-11-02 RX ADMIN — LIDOCAINE HYDROCHLORIDE 0.5 ML: 10 INJECTION, SOLUTION EPIDURAL; INFILTRATION; INTRACAUDAL; PERINEURAL at 11:55

## 2018-11-02 RX ADMIN — ACETAMINOPHEN 650 MG: 325 TABLET ORAL at 12:05

## 2018-11-02 RX ADMIN — NEOSTIGMINE METHYLSULFATE 3 MG: 1 INJECTION, SOLUTION INTRAVENOUS at 15:50

## 2018-11-02 RX ADMIN — CEFAZOLIN SODIUM 2 G: 2 INJECTION, SOLUTION INTRAVENOUS at 21:16

## 2018-11-02 RX ADMIN — LIDOCAINE HYDROCHLORIDE 50 MG: 10 INJECTION, SOLUTION EPIDURAL; INFILTRATION; INTRACAUDAL; PERINEURAL at 14:05

## 2018-11-02 RX ADMIN — HYDROCODONE BITARTRATE AND ACETAMINOPHEN 1 TABLET: 7.5; 325 TABLET ORAL at 12:05

## 2018-11-02 RX ADMIN — FAMOTIDINE 20 MG: 20 TABLET ORAL at 12:05

## 2018-11-02 RX ADMIN — CEFAZOLIN SODIUM 2 G: 2 INJECTION, SOLUTION INTRAVENOUS at 14:10

## 2018-11-02 RX ADMIN — SODIUM CHLORIDE, POTASSIUM CHLORIDE, SODIUM LACTATE AND CALCIUM CHLORIDE 9 ML/HR: 600; 310; 30; 20 INJECTION, SOLUTION INTRAVENOUS at 11:55

## 2018-11-02 RX ADMIN — GLYCOPYRROLATE 0.4 MG: 0.2 INJECTION, SOLUTION INTRAMUSCULAR; INTRAVENOUS at 15:50

## 2018-11-02 NOTE — PLAN OF CARE
Problem: Patient Care Overview  Goal: Plan of Care Review  Outcome: Ongoing (interventions implemented as appropriate)   11/02/18 4674   Coping/Psychosocial   Plan of Care Reviewed With patient;spouse   Plan of Care Review   Progress improving   OTHER   Outcome Summary Pt arrived from PACU in evening. Vitals stable. No numbness/tingling. Pain managed.; will monitor overnight.        Problem: Laminectomy/Foraminotomy/Discectomy (Adult)  Goal: Signs and Symptoms of Listed Potential Problems Will be Absent, Minimized or Managed (Laminectomy/Foraminotomy/Discectomy)  Outcome: Ongoing (interventions implemented as appropriate)

## 2018-11-02 NOTE — H&P
Pre-Op H&P  Amy Powell  [id number]  1945    Chief complaint: Low back pain into LLE    HPI:    8/13/18 office visit:  73 y.o. female   The very pleasant 72-year-old female well-known to me she had a recent fall about 2 months ago with history of back pain and bilateral abdominal symptomatology some of which is better but some which was made worse from physical therapy.  It's difficult to say if she is overall improved but she is not 100% better she reports some left-sided sciatic symptoms bilateral hip pain possibly of thoracic or lumbar pain and had intermittent incontinence during this but most of it was urgent incontinence she stated no evidence of cauda equina type of symptomatology she's here for evaluation      She's been participating in physical therapy and still have some symptoms of left neurogenic claudication predominantly in the left buttock hip and leg a lot of her symptoms however are circumferential referrable to the T11 area and she has a history of abdominal symptomatology as well she does occasionally have right-sided symptoms.  She denies any bowel bladder incontinence issues that are new for her some of her pain is better it's difficult to ascertain the degree of symptoms that she has regarding her left lower extremity as far as intensity she still is caring for her .     She is still no better she denies any bowel bladder incontinence issues she has predominantly left leg but some right hip pain    11/2/18:  Here today for LUMBAR LAMINECTOMY L3-5 left, KYPHOPLASTY T12    Review of Systems:  General ROS: negative for chills, fever or skin lesions;  No changes since last office visit  Cardiovascular ROS: no chest pain or dyspnea on exertion.  +cardiac clearance.  Follows with Dr. Zamora with TTE 2017 EF 35-40% mild mr.  2017 MPS negative/old MI  Respiratory ROS: no cough, shortness of breath, or wheezing  Neuro:  No CVA residual    Allergies:   Allergies   Allergen Reactions   •  Atorvastatin Hives   • Norvasc  [Amlodipine Besylate] Other (See Comments)   • Insulin Nph Isophane & Regular Rash     (NOVOLIN)       Home Meds:    No current facility-administered medications on file prior to encounter.      Current Outpatient Prescriptions on File Prior to Encounter   Medication Sig Dispense Refill   • aspirin 81 MG EC tablet Take 81 mg by mouth Daily.     • carvedilol (COREG) 3.125 MG tablet Take 3.125 mg by mouth 2 (Two) Times a Day With Meals.     • diphenhydrAMINE (BENADRYL) 25 mg capsule Take 25 mg by mouth Every 6 (Six) Hours As Needed for Itching.     • furosemide (LASIX) 20 MG tablet Take 20 mg by mouth Daily.     • Insulin Lispro Prot & Lispro (75-25) 100 UNIT/ML suspension pen-injector Inject 20 Units under the skin into the appropriate area as directed Every Morning.     • levothyroxine (SYNTHROID, LEVOTHROID) 100 MCG tablet Take 100 mcg by mouth Daily.     • lisinopril (PRINIVIL,ZESTRIL) 2.5 MG tablet Take 2.5 mg by mouth every night at bedtime.     • pravastatin (PRAVACHOL) 40 MG tablet Take 40 mg by mouth Every Night.     • [DISCONTINUED] chlorhexidine (HIBICLENS) 4 % external liquid Shower each day with solution for 5 days beginning 5 days before surgery. 120 mL 0       PMH:   Past Medical History:   Diagnosis Date   • Arthritis    • Asthma    • Cardiomyopathy (CMS/[facility])     2017 TTE 35-40% EF   • Coronary artery disease    • Diabetes mellitus (CMS/[facility])    • Disease of thyroid gland    • DVT (deep vein thrombosis) in pregnancy (CMS/[facility])    • DVT of lower limb, acute (CMS/[facility]) 1977; 2004    X 2 LEFT LEG   • Frequent urination     since fall    • H/O Prinzmetal angina    • Heart attack (CMS/HCC) 1996/ 1998    x2   • History of pneumonia    • History of transfusion    • Psoriasis    • Stroke (CMS/[facility]) 1996   • Ulcerative colitis (CMS/[facility])    • Urinary incontinence     after fall   • Wears dentures      PSH:    Past Surgical History:   Procedure Laterality Date   • APPENDECTOMY     •  CARDIAC CATHETERIZATION     • CARPAL TUNNEL RELEASE     • CHOLECYSTECTOMY     • HYSTERECTOMY      AGE 54   • HYSTERECTOMY     • OOPHORECTOMY       Social History:   Tobacco:   History   Smoking Status   • Former Smoker   • Quit date: 1998   Smokeless Tobacco   • Never Used      Alcohol:     History   Alcohol Use No       Vitals:           /76 (BP Location: Right arm, Patient Position: Lying)   Pulse 100   Temp 98.5 °F (36.9 °C) (Temporal Artery )   Resp 18   SpO2 99%     Physical Exam:  General Appearance:    Alert, cooperative, no distress, appears stated age   Head:    Normocephalic, without obvious abnormality, atraumatic   Lungs:     Clear to auscultation bilaterally, respirations unlabored    Heart:   Regular rate and rhythm, S1 and S2 normal, no murmur, rub    or gallop    Abdomen:    Soft, non-tender.  +bowel sounds   Breast Exam:    deferred   Genitalia:    deferred   Extremities:   Extremities normal, atraumatic, no cyanosis or mild LE edema   Skin:   Skin color, texture, turgor normal, no rashes or lesions   Neurologic:   Grossly intact   Results Review  I reviewed the patient's new clinical results.    Cancer Staging (if applicable)  Cancer Patient: __ yes _x_no __unknown; If yes, clinical stage T:__ N:__M:__, stage group or __N/A    Impression/Plan: Ms. Powell is a 73 y.o. female   She is very complex mobility issues of recommended surgery I like to get a T12 kyphoplasty performed I expand the risks benefits of this I also think it's prudent to do a left-sided approach L3 4 L4 5 laminectomy and lamina foraminotomy discectomy she has a lot of intraforaminal disease this may not work in the sense that she may require a fusion but it represents a minimally invasive option.     I would monitor the T9 10 disc disease issue if all these other options fail we can reimage this and consider appropriate treatment.    Daisy Girard, APRN   11/2/2018   12:14 PM

## 2018-11-02 NOTE — ANESTHESIA PREPROCEDURE EVALUATION
Anesthesia Evaluation     Patient summary reviewed and Nursing notes reviewed   no history of anesthetic complications:  NPO Solid Status: > 8 hours  NPO Liquid Status: > 8 hours           Airway   Mallampati: II  TM distance: >3 FB  Neck ROM: full  No difficulty expected  Dental    (+) partials and upper dentures    Pulmonary - normal exam   (+) a smoker Former,   Cardiovascular - normal exam  Exercise tolerance: good (4-7 METS)    ECG reviewed    (+) hypertension, past MI (1996/1998) , CAD, cardiac stents more than 12 months ago angina (prinzmetal angina), PVD, DVT,     ROS comment: Cleared per cardiology  EF 35-40% mild MR    Neuro/Psych  (+) CVA (left eye no residuals 1996),     GI/Hepatic/Renal/Endo    (+)   hepatitis (as child) A, diabetes mellitus,   (-) GERD, liver disease, no renal disease, hypothyroidism    ROS Comment: ulcerative colitis    Musculoskeletal     (+) back pain,       ROS comment: T12 compression fx  Abdominal   (+) obese,    Substance History      OB/GYN          Other   (+) arthritis   history of cancer (ovarian cancer in past)                  Anesthesia Plan    ASA 3     general     intravenous induction   Anesthetic plan, all risks, benefits, and alternatives have been provided, discussed and informed consent has been obtained with: patient.    Plan discussed with CRNA.

## 2018-11-02 NOTE — OP NOTE
Preoperative diagnosis  Suspected T12 osteoporotic pathologic compression fracture,   intractable pain  Unilateral neurogenic claudication left-handed side with radiation down the leg      Postoperative diagnosis is same      Procedures performed  1.  T12 kyphoplasty  2.  T12 biopsy left pedicle    3.  Unilateral lamina foraminotomy L2-3 L3 4 L4 5    Findings were hard disc osteophyte complex at the left L4 5 area    Surgeons Carlos Alcazar M.D.      Gen. endotracheal anesthesia    No immediate complications    Procedure in detail    After formal written consent was obtained she was taken to the operating room prepped and draped in the usual sterile manner after this point in time.  AP and lateral biplanar fluoroscopy was used to identify the T12 level from counting up from the sacrum.  At this point time unilateral Jamshidi needle was docked easily on the pedicle navigation across the midline with a curved needle was attempted after balloon kyphoplasty however there was unable to be able to navigate this through the midline of the vertebral body likely secondary to partial healing of the bone injection of cement immediately liberated cement towards the posterior pedicle and this stopped Jamshidi needle docking on the contralateral facet on the right side was performed and again a similar procedure of balloon inflation followed by her injection of cement which again tracked posteriorly this was truncated    The cement would suggest that this was a chronic problem the left-sided biopsy was easily taken and sent for pathology    Attention was then turned to the midline and incision was made dissection was carried down unilaterally on the left hand side unilateral lamina foraminotomy was performed at L4 5 L3 4 and L2-3 in standard fashion hard disc fragment was noted under the L4 5 area and adequate decompression was achieved at all the levels    Following this the incision was closed in layers after meticulous  hemostasis Marcaine was instilled in the paraspinal musculature

## 2018-11-02 NOTE — ANESTHESIA POSTPROCEDURE EVALUATION
Patient: Amy Powell    Procedure Summary     Date:  11/02/18 [facility] / [facility]:   [facility]    Anesthesia Start:  1356 Anesthesia Stop:      Procedures:       LUMBAR HEMILAMINECTOMY L2-5 LEFT (Left Spine Lumbar)      KYPHOPLASTY T12 (N/A Spine Lumbar) Diagnosis:       Spinal stenosis, lumbar region, with neurogenic claudication      (Spinal stenosis, lumbar region, with neurogenic claudication [M48.062])    Surgeon:  Carlos Alcazar MD Provider:  Gomez Locke MD    Anesthesia Type:  general ASA Status:  3          Anesthesia Type: general  Last vitals  BP   132/76 (11/02/18 1156)122/65   Temp   98.5 °F (36.9 °C) (11/02/18 1156)97   Pulse   100 (11/02/18 1156)87   Resp   18 (11/02/18 1156)  18   SpO2   99 % (11/02/18 1156)98     Post Anesthesia Care and Evaluation    Patient location during evaluation: PACU  Patient participation: complete - patient participated  Level of consciousness: awake and alert  Pain score: 0  Pain management: adequate  Airway patency: patent  Anesthetic complications: No anesthetic complications  PONV Status: none  Cardiovascular status: hemodynamically stable and acceptable  Respiratory status: nonlabored ventilation, acceptable and nasal cannula  Hydration status: acceptable

## 2018-11-02 NOTE — ANESTHESIA PROCEDURE NOTES
Airway  Urgency: elective    Airway not difficult    General Information and Staff    Patient location during procedure: OR  CRNA: JOSE DANIEL MONTELONGO    Indications and Patient Condition  Indications for airway management: airway protection    Preoxygenated: yes  MILS not maintained throughout  Mask difficulty assessment: 2 - vent by mask + OA or adjuvant +/- NMBA    Final Airway Details  Final airway type: endotracheal airway      Successful airway: ETT  Cuffed: yes   Successful intubation technique: direct laryngoscopy  Facilitating devices/methods: intubating stylet  Endotracheal tube insertion site: oral  Blade: Zackary  Blade size: 3  ETT size: 7.0 mm  Cormack-Lehane Classification: grade I - full view of glottis  Placement verified by: chest auscultation and capnometry   Cuff volume (mL): 5  Measured from: lips  ETT to lips (cm): 21  Number of attempts at approach: 1    Additional Comments  Negative epigastric sounds, Breath sound equal bilaterally with symmetric chest rise and fall

## 2018-11-03 VITALS
TEMPERATURE: 98.3 F | WEIGHT: 187.39 LBS | BODY MASS INDEX: 37.78 KG/M2 | RESPIRATION RATE: 20 BRPM | SYSTOLIC BLOOD PRESSURE: 122 MMHG | HEART RATE: 68 BPM | HEIGHT: 59 IN | OXYGEN SATURATION: 95 % | DIASTOLIC BLOOD PRESSURE: 60 MMHG

## 2018-11-03 PROCEDURE — A9270 NON-COVERED ITEM OR SERVICE: HCPCS | Performed by: NEUROLOGICAL SURGERY

## 2018-11-03 PROCEDURE — 63710000001 FUROSEMIDE 40 MG TABLET: Performed by: NEUROLOGICAL SURGERY

## 2018-11-03 PROCEDURE — 63710000001 CARVEDILOL 3.125 MG TABLET: Performed by: NEUROLOGICAL SURGERY

## 2018-11-03 PROCEDURE — 25010000003 CEFAZOLIN IN DEXTROSE 2-4 GM/100ML-% SOLUTION: Performed by: NEUROLOGICAL SURGERY

## 2018-11-03 PROCEDURE — G8980 MOBILITY D/C STATUS: HCPCS

## 2018-11-03 PROCEDURE — 97162 PT EVAL MOD COMPLEX 30 MIN: CPT

## 2018-11-03 PROCEDURE — G8979 MOBILITY GOAL STATUS: HCPCS

## 2018-11-03 PROCEDURE — 63710000001 ASPIRIN 81 MG TABLET DELAYED-RELEASE: Performed by: NEUROLOGICAL SURGERY

## 2018-11-03 PROCEDURE — G8978 MOBILITY CURRENT STATUS: HCPCS

## 2018-11-03 PROCEDURE — 63710000001 HYDROCODONE-ACETAMINOPHEN 5-325 MG TABLET: Performed by: NEUROLOGICAL SURGERY

## 2018-11-03 PROCEDURE — 25010000002 ONDANSETRON PER 1 MG: Performed by: NEUROLOGICAL SURGERY

## 2018-11-03 PROCEDURE — 63710000001 LEVOTHYROXINE 100 MCG TABLET: Performed by: NEUROLOGICAL SURGERY

## 2018-11-03 RX ORDER — ONDANSETRON 2 MG/ML
4 INJECTION INTRAMUSCULAR; INTRAVENOUS EVERY 6 HOURS PRN
Status: DISCONTINUED | OUTPATIENT
Start: 2018-11-03 | End: 2018-11-03 | Stop reason: HOSPADM

## 2018-11-03 RX ADMIN — ONDANSETRON 4 MG: 2 INJECTION INTRAMUSCULAR; INTRAVENOUS at 04:14

## 2018-11-03 RX ADMIN — Medication 3 ML: at 09:43

## 2018-11-03 RX ADMIN — ASPIRIN 81 MG: 81 TABLET, COATED ORAL at 09:42

## 2018-11-03 RX ADMIN — LEVOTHYROXINE SODIUM 100 MCG: 100 TABLET ORAL at 06:22

## 2018-11-03 RX ADMIN — CEFAZOLIN SODIUM 2 G: 2 INJECTION, SOLUTION INTRAVENOUS at 06:22

## 2018-11-03 RX ADMIN — CARVEDILOL 3.12 MG: 3.12 TABLET, FILM COATED ORAL at 09:42

## 2018-11-03 RX ADMIN — FUROSEMIDE 20 MG: 40 TABLET ORAL at 09:42

## 2018-11-03 RX ADMIN — HYDROCODONE BITARTRATE AND ACETAMINOPHEN 2 TABLET: 5; 325 TABLET ORAL at 04:14

## 2018-11-03 NOTE — PLAN OF CARE
Problem: Patient Care Overview  Goal: Plan of Care Review  Outcome: Ongoing (interventions implemented as appropriate)   11/03/18 9876   Coping/Psychosocial   Plan of Care Reviewed With patient   Plan of Care Review   Progress improving   OTHER   Outcome Summary vitals stable, pain adequately controlled, no neurological decline, no neurovascular decline, ambulating well with stand by assist, voiding well, rested well.

## 2018-11-03 NOTE — DISCHARGE INSTR - ACTIVITY
No bending, lifting, or twisting at the waist.     Use ice as needed for pain and swelling.     Walk frequently.

## 2018-11-03 NOTE — THERAPY DISCHARGE NOTE
Acute Care - Physical Therapy Initial Eval/Discharge   Bill     Patient Name: Amy Powell  : 1945  MRN: [id number]  Today's Date: 11/3/2018   Onset of Illness/Injury or Date of Surgery: 18  Date of Referral to PT: 18  Referring Physician: MD Ottoniel      Admit Date: 2018    Visit Dx:    ICD-10-CM ICD-9-CM   1. Impaired functional mobility, balance, gait, and endurance Z74.09 V49.89   2. Spinal stenosis, lumbar region, with neurogenic claudication M48.062 724.03     Patient Active Problem List   Diagnosis   • Bulging lumbar disc   • Spinal stenosis of lumbar region with neurogenic claudication   • HNP (herniated nucleus pulposus), lumbar   • DDD (degenerative disc disease), lumbar   • T12 compression fracture (CMS/HCC)   • Spinal stenosis, lumbar region, with neurogenic claudication   • Neurogenic claudication     Past Medical History:   Diagnosis Date   • Arthritis    • Asthma    • Cardiomyopathy (CMS/[facility])      TTE 35-40% EF   • Coronary artery disease    • Diabetes mellitus (CMS/HCC)    • Disease of thyroid gland    • DVT (deep vein thrombosis) in pregnancy (CMS/HCC)    • DVT of lower limb, acute (CMS/HCC) ; 2004    X 2 LEFT LEG   • Frequent urination     since fall    • H/O Prinzmetal angina    • Heart attack (CMS/HCC) 1996/ 1998    x2   • History of pneumonia    • History of transfusion    • Psoriasis    • Stroke (CMS/HCC)    • Ulcerative colitis (CMS/[facility])    • Urinary incontinence     after fall   • Wears dentures      Past Surgical History:   Procedure Laterality Date   • APPENDECTOMY     • CARDIAC CATHETERIZATION     • CARPAL TUNNEL RELEASE     • CHOLECYSTECTOMY     • HYSTERECTOMY      AGE 54   • HYSTERECTOMY     • OOPHORECTOMY            PT ASSESSMENT (last 12 hours)      Physical Therapy Evaluation     Row Name 18 0955          PT Evaluation Time/Intention    Subjective Information complains of;pain  -SJ     Document Type discharge evaluation/summary  -SJ      Mode of Treatment individual therapy  -SJ     Patient Effort good  -SJ     Symptoms Noted During/After Treatment none  -SJ     Row Name 11/03/18 0955          General Information    Patient Profile Reviewed? yes  -SJ     Onset of Illness/Injury or Date of Surgery 11/02/18  -     Referring Physician MD Ottoniel  -SJ     Patient Observations alert;cooperative;agree to therapy  -SJ     Patient/Family Observations no family present  -SJ     General Observations of Patient Pt standing in room with nursing staff  -SJ     Prior Level of Function independent:;all household mobility;community mobility;gait;transfer;bed mobility;ADL's;driving;shopping  -SJ     Equipment Currently Used at Home none  -SJ     Pertinent History of Current Functional Problem 72 y.o.F s/p L3-5 lami, T12 kyphoplasty secondary o T12 compression fx, unilateral neurogenic claudication with L sided symptoms  -SJ     Existing Precautions/Restrictions spinal  -SJ     Risks Reviewed patient:;LOB;increased discomfort  -SJ     Benefits Reviewed patient:;improve function;increase independence;increase knowledge  -SJ     Barriers to Rehab none identified  -SJ     Row Name 11/03/18 0955          Relationship/Environment    Primary Source of Support/Comfort spouse;child(mac)  -SJ     Lives With spouse;grandchild(mac)  -SJ     Primary Roles/Responsibilities caregiver for other(s)  -SJ     Concerns About Impact on Relationships provides care for disabled   -SJ     Row Name 11/03/18 0955          Resource/Environmental Concerns    Current Living Arrangements home/apartment/condo  -     Resource/Environmental Concerns none  -SJ     Row Name 11/03/18 0955          Cognitive Assessment/Intervention- PT/OT    Orientation Status (Cognition) oriented x 3  -SJ     Follows Commands (Cognition) WFL  -SJ     Row Name 11/03/18 0955          Safety Issues, Functional Mobility    Impairments Affecting Function (Mobility) pain  -SJ     Row Name 11/03/18 0955           Bed Mobility Assessment/Treatment    Comment (Bed Mobility) not assessed.   -     Row Name 11/03/18 0955          Transfer Assessment/Treatment    Transfer Assessment/Treatment sit-stand transfer;stand-sit transfer  -SJ     Comment (Transfers) no safety concerns  -     Sit-Stand [place] (Transfers) conditional independence  -SJ     Stand-Sit [place] (Transfers) conditional independence  -     Row Name 11/03/18 0955          Gait/Stairs Assessment/Training    Gait/Stairs Assessment/Training distance ambulated  -SJ     [place] Level (Gait) supervision  -     Distance in Feet (Gait) 500  -SJ     Pattern (Gait) swing-through  -SJ     Comment (Gait/Stairs) good safety, limited by pain  -     Row Name 11/03/18 0955          General ROM    GENERAL ROM COMMENTS BLE's WNL  -     Row Name 11/03/18 0955          MMT (Manual Muscle Testing)    General MMT Comments BLE's WFL  -[facility] Name 11/03/18 0955          Motor Assessment/Intervention    Additional Documentation Balance (Group)  -[facility] Name 11/03/18 0955          Balance    Balance static sitting balance;static standing balance;dynamic standing balance  -[facility] Name 11/03/18 0955          Static Sitting Balance    Level of [place] (Unsupported Sitting, Static Balance) conditional independence  -SJ     Sitting Position (Unsupported Sitting, Static Balance) sitting on edge of bed  -SJ     Time Able to Maintain Position (Unsupported Sitting, Static Balance) more than 5 minutes  -     Row Name 11/03/18 0955          Static Standing Balance    Level of [place] (Supported Standing, Static Balance) conditional independence  -SJ     Time Able to Maintain Position (Supported Standing, Static Balance) more than 5 minutes  -     Row Name 11/03/18 0955          Dynamic Standing Balance    Level of [place], Reaches Outside Midline (Standing, Dynamic Balance) supervision  -     Row Name 11/03/18 0955          Pain  Assessment    Additional Documentation Pain Scale: Word Pre/Post-Treatment (Group)  -SJ     Row Name 11/03/18 0955          Pain Scale: Numbers Pre/Post-Treatment    Pain Location - Orientation lower  -SJ     Pain Location back  -SJ     Pain Intervention(s) Repositioned;Ambulation/increased activity  -SJ     Row Name 11/03/18 0955          Pain Scale: Word Pre/Post-Treatment    Pain: Word Scale, Pretreatment 2 - mild pain  -SJ     Pain: Word Scale, Post-Treatment 4 - moderate pain  -SJ     Row Name             Wound 11/02/18 1503 Other (See comments) back incision    Wound - Properties Group Date first assessed: 11/02/18  -WS Time first assessed: 1503  -WS Side: Other (See comments)  -WS Location: back  -WS Type: incision  -WS    Row Name 11/03/18 0955          Coping    Observed Emotional State calm;cooperative  -SJ     Verbalized Emotional State acceptance  -SJ     Row Name 11/03/18 0955          Plan of Care Review    Plan of Care Reviewed With patient  -SJ     Row Name 11/03/18 0955          Physical Therapy Clinical Impression    Date of Referral to PT 11/02/18  -SJ     PT Diagnosis (PT Clinical Impression) impaired mobility  -SJ     Patient/Family Goals Statement (PT Clinical Impression) return to home today  -SJ     Criteria for Skilled Interventions Met (PT Clinical Impression) yes;treatment indicated  -SJ     Pathology/Pathophysiology Noted (Describe Specifically for Each System) musculoskeletal  -SJ     Impairments Found (describe specific impairments) gait, locomotion, and balance  -SJ     Functional Limitations in Following Categories (Describe Specific Limitations) self-care;home management;community/leisure  -SJ     Predicted Duration of Therapy (PT) 1x visit  -SJ     Care Plan Review (PT) evaluation/treatment results reviewed;care plan/treatment goals reviewed;risks/benefits reviewed;current/potential barriers reviewed;patient/other agree to care plan  -SJ     Row Name 11/03/18 0955          Patient  Education Goal (PT)    Activity (Patient Education Goal, PT) spinal precautions, HEP  -SJ     [place]/Cues/Accuracy (Memory Goal 2, PT) verbalizes understanding  -SJ     Time Frame (Patient Education Goal, PT) 1 day  -SJ     Progress/Outcome (Patient Education Goal, PT) goal met  -SJ     Row Name 11/03/18 0955          Positioning and Restraints    Pre-Treatment Position standing in room  -SJ     Post Treatment Position bed  -SJ     In Bed sitting EOB;call light within reach;encouraged to call for assist  -SJ       User Key  (r) = Recorded By, (t) = Taken By, (c) = Cosigned By    Initials Name Provider Type     Meghann Rodriguez, PT Physical Therapist    Kenzie Guo, RN Registered Nurse          Physical Therapy Education     Title: PT OT SLP Therapies (Done)     Topic: Physical Therapy (Done)     Point: Mobility training (Done)    Learning Progress Summary     Learner Status Readiness Method Response Comment Documented by    Patient Done Acceptance E,D,H ENRIQUE TAO,Bed [facility] 11/03/18 1024          Point: Home exercise program (Done)    Learning Progress Summary     Learner Status Readiness Method Response Comment Documented by    Patient Done Acceptance E,D,H ENRIQUE TAO,[facility] 11/03/18 1024          Point: Body mechanics (Done)    Learning Progress Summary     Learner Status Readiness Method Response Comment Documented by    Patient Done Acceptance E,D,H ENRIQUE TAO,[facility] 11/03/18 1024          Point: Precautions (Done)    Learning Progress Summary     Learner Status Readiness Method Response Comment Documented by    Patient Done Acceptance E,D,H ENRIQUE TAO,[facility] 11/03/18 1024                      User Key     Initials Effective Dates Name Provider Type [facility] 06/19/15 -  Meghann Rodriguez, PT Physical Therapist PT                PT Recommendation and Plan  Anticipated Discharge Disposition (PT): home with assist  Planned Therapy Interventions (PT Eval): home exercise program, gait  training  Therapy Frequency (PT Clinical Impression): evaluation only  Outcome Summary/Treatment Plan (PT)  Anticipated Discharge Disposition (PT): home with assist  Plan of Care Reviewed With: patient  Outcome Summary: PT eval completed. Pt presents s/p lumbar lami. Pt performs transfers independently, ambulates in hallway x500ft with supervision, verbalizes understanding of spinal precautions and HEP. Pt to d/c home today with assist from grandson. No further PT intervention warranted at this time.          Outcome Measures     Row Name 11/03/18 0955             How much help from another person do you currently need...    Turning from your back to your side while in flat bed without using bedrails? 4  -SJ      Moving from lying on back to sitting on the side of a flat bed without bedrails? 4  -SJ      Moving to and from a bed to a chair (including a wheelchair)? 4  -SJ      Standing up from a chair using your arms (e.g., wheelchair, bedside chair)? 4  -SJ      Climbing 3-5 steps with a railing? 4  -SJ      To walk in hospital room? 4  -SJ      AM-PAC 6 Clicks Score 24  -SJ         Functional Assessment    Outcome Measure Options AM-PAC 6 Clicks Basic Mobility (PT)  -        User Key  (r) = Recorded By, (t) = Taken By, (c) = Cosigned By    Initials Name Provider Type    Meghann Mendez PT Physical Therapist           Time Calculation:         PT Charges     Row Name 11/03/18 1025             Time Calculation    Start Time 0955  -SJ      PT Received On 11/03/18  -        User Key  (r) = Recorded By, (t) = Taken By, (c) = Cosigned By    Initials Name Provider Type    Meghann Mendez PT Physical Therapist        Therapy Suggested Charges     Code   Minutes Charges    None           Therapy Charges for Today     Code Description Service Date Service Provider Modifiers Qty    19350690527 HC PT MOBILITY CURRENT 11/3/2018 Meghann Rodriguez PT GP, CI 1    17152850256 HC PT MOBILITY PROJECTED 11/3/2018 Michael  Meghann, PT GP, CI 1    37804288922 HC PT MOBILITY DISCHARGE 11/3/2018 Meghann Rodriguez, PT GP, CI 1    80765764966 HC PT EVAL MOD COMPLEXITY 2 11/3/2018 Meghann Rodriguez, PT GP 1          PT G-Codes  Outcome Measure Options: AM-PAC 6 Clicks Basic Mobility (PT)  AM-PAC 6 Clicks Score: 24  Functional Limitation: Mobility: Walking and moving around  Mobility: Walking and Moving Around Current Status (): At least 1 percent but less than 20 percent impaired, limited or restricted  Mobility: Walking and Moving Around Goal Status (): At least 1 percent but less than 20 percent impaired, limited or restricted  Mobility: Walking and Moving Around Discharge Status (): At least 1 percent but less than 20 percent impaired, limited or restricted    PT Discharge Summary  Anticipated Discharge Disposition (PT): home with assist  Reason for Discharge: All goals achieved, Independent  Outcomes Achieved: Able to achieve all goals within established timeline    Meghann Rodriguez, CHAS  11/3/2018

## 2018-11-03 NOTE — NURSING NOTE
"Reviewed discharge instructions w/ pt and family.  Medications delivered to bedside from [facility] pharm.  PIV removed prior to discharge.  Pt walked to private vehicle w/ family. Declined hospital transport-\"I don't want to get caught in game traffic.\"  "

## 2018-11-03 NOTE — PLAN OF CARE
Problem: Patient Care Overview  Goal: Plan of Care Review  Outcome: Ongoing (interventions implemented as appropriate)   11/03/18 1022   Coping/Psychosocial   Plan of Care Reviewed With patient   OTHER   Outcome Summary PT eval completed. Pt presents s/p lumbar lami. Pt performs transfers independently, ambulates in hallway x500ft with supervision, verbalizes understanding of spinal precautions and HEP. Pt to d/c home today with assist from grandson. No further PT intervention warranted at this time.

## 2018-11-06 ENCOUNTER — TELEPHONE (OUTPATIENT)
Dept: NEUROSURGERY | Facility: CLINIC | Age: 73
End: 2018-11-06

## 2018-11-06 ENCOUNTER — HOSPITAL ENCOUNTER (EMERGENCY)
Facility: HOSPITAL | Age: 73
Discharge: LEFT BEFORE DISCHARGE | End: 2018-11-06
Attending: EMERGENCY MEDICINE

## 2018-11-06 VITALS
SYSTOLIC BLOOD PRESSURE: 126 MMHG | HEIGHT: 59 IN | BODY MASS INDEX: 37.09 KG/M2 | RESPIRATION RATE: 16 BRPM | OXYGEN SATURATION: 99 % | HEART RATE: 86 BPM | DIASTOLIC BLOOD PRESSURE: 59 MMHG | TEMPERATURE: 98.7 F | WEIGHT: 184 LBS

## 2018-11-06 LAB
CYTO UR: NORMAL
LAB AP CASE REPORT: NORMAL
LAB AP CLINICAL INFORMATION: NORMAL
PATH REPORT.FINAL DX SPEC: NORMAL
PATH REPORT.GROSS SPEC: NORMAL

## 2018-11-06 NOTE — TELEPHONE ENCOUNTER
Provider:  Ottoniel  Caller:  pt  Time of call:  09:07am   Phone #:  [phone]  Surgery:  Hemilami L2-5   Surgery Date:  11/02/18  Last visit:   Prior to sx  Next visit:  11/19/18    Reason for call:         ----- Message from Mary Gomez sent at 11/6/2018  9:07 AM EST -----  Regarding: Pain in left leg since surgery on 11/2/18 - Ottoniel Patient   Contact: [phone]  I called this patient to get her scheduled for her post op visit and she asked me to get a message to our clinical staff.    She said that since her surgery on 11/2/18 she is having pain radiating down through her left leg and her knee is abnormally swollen. She said she is no longer having back pain but she is concerned about the pain she is having in her left leg.    Ottoniel did a lumbar hemilaminectomy L2-5 left. Can someone advise the situation and give her a call back? She is a super nice lady and I told her that someone should get back with her today.     Thank you all!

## 2018-11-06 NOTE — TELEPHONE ENCOUNTER
I spoke to patient and this pain started abruptly yesterday morning. She has swelling in the left knee, pain in the left groin, she used a can to ambulate today because of pain with weight bearing. Her wound is fine, no fevers. She has a hx of dvt in the left leg x2. I have asked her to go to the ED to be checked for DVT.

## 2018-11-07 ENCOUNTER — TELEPHONE (OUTPATIENT)
Dept: PAIN MEDICINE | Facility: CLINIC | Age: 73
End: 2018-11-07

## 2018-11-07 NOTE — TELEPHONE ENCOUNTER
I called patient and had to leave a voice mail.  I offered her an apt tomorrow and asked her to give us a call if she is interested in coming in.

## 2018-11-07 NOTE — TELEPHONE ENCOUNTER
Provider:  Alycia  Caller: patient  Time of call:  11:54   Phone #:  [phone]  Surgery:  Lumbar kenneth-lami L2-L5 left  Surgery Date:  11-02-18  Last visit:   same  Next visit: 11-19-18    KAREN:         Reason for call:     Patient just wanted to let Tasia LEONE know that she's ok.  She went to the ED last night because she thought she may have a blood clot. She arrived around 6:00pm and stayed almost until 10:00pm.  There were patients there that had been waiting since 12 noon.  Patient finally left without being seen, but she wanted to let Tasia LEONE know that her left knee is still swollen, however she no longer has any groin pain.  There is no redness in their groin and it is not hot to the touch.  She is using heat on the knee and it has been helpful.  She has had a blood clot before so she said she knows what to watch for.  (She heard good things about Tasia LEONE at the hospital.)

## 2018-11-19 ENCOUNTER — OFFICE VISIT (OUTPATIENT)
Dept: NEUROSURGERY | Facility: CLINIC | Age: 73
End: 2018-11-19

## 2018-11-19 VITALS
SYSTOLIC BLOOD PRESSURE: 138 MMHG | BODY MASS INDEX: 37.09 KG/M2 | WEIGHT: 184 LBS | DIASTOLIC BLOOD PRESSURE: 68 MMHG | TEMPERATURE: 98 F | HEIGHT: 59 IN

## 2018-11-19 DIAGNOSIS — G95.19 NEUROGENIC CLAUDICATION: ICD-10-CM

## 2018-11-19 DIAGNOSIS — Z98.890 S/P HEMILAMINOTOMY: Primary | ICD-10-CM

## 2018-11-19 DIAGNOSIS — M51.36 DDD (DEGENERATIVE DISC DISEASE), LUMBAR: ICD-10-CM

## 2018-11-19 PROCEDURE — 99024 POSTOP FOLLOW-UP VISIT: CPT | Performed by: PHYSICIAN ASSISTANT

## 2018-11-19 RX ORDER — LISINOPRIL 5 MG/1
TABLET ORAL
COMMUNITY
Start: 2018-08-23 | End: 2018-12-17

## 2018-11-19 RX ORDER — NITROGLYCERIN 0.4 MG/1
TABLET SUBLINGUAL
COMMUNITY
Start: 2018-08-20

## 2018-11-19 NOTE — PROGRESS NOTES
Subjective   Patient ID: Amy Powell is a 73 y.o. female is here today for follow-up for wound check and staple removal.    HPI:      She is well-known to the neurosurgical practice for undergoing a fall about 3 months ago because some abdominal discomfort and left leg sciatica symptoms.  Patient was deemed surgical candidate by Dr. Carlos Alcazar.  Patient underwent a T12 kyphoplasty as well as a unilateral laminectomy from L2-5.    Patient is in today for staple removal.  Incision is clean dry to signs of infection bleeding or erythema.  Staples removed without event. Patient states that the abdominal pain she had prior to surgery has gotten a lot better.  Patient continues to have a little bit of left hip pain but nothing that she can't live with.  Patient states that she is 85-90% better as far as the pain goes.  Patient is resuming activities of daily living and is very pleased with the medicines that she can walk with no pain.    Patient is very pleased postsurgical outcome.       The following portions of the patient's history were reviewed and updated as appropriate: allergies, current medications, past family history, past medical history, past social history, past surgical history and problem list.    Review of Systems   Constitutional: Positive for appetite change. Negative for activity change, chills, diaphoresis, fatigue, fever and unexpected weight change.   HENT: Negative for congestion, dental problem, drooling, ear discharge, ear pain, facial swelling, hearing loss, mouth sores, nosebleeds, postnasal drip, rhinorrhea, sinus pressure, sneezing, sore throat, tinnitus, trouble swallowing and voice change.    Eyes: Negative for photophobia, pain, discharge, redness, itching and visual disturbance.   Respiratory: Negative for apnea, cough, choking, chest tightness, shortness of breath, wheezing and stridor.    Cardiovascular: Positive for leg swelling. Negative for chest pain and palpitations.  "  Gastrointestinal: Positive for abdominal pain and nausea. Negative for abdominal distention, anal bleeding, blood in stool, constipation, diarrhea, rectal pain and vomiting.   Endocrine: Negative for cold intolerance, heat intolerance, polydipsia, polyphagia and polyuria.   Genitourinary: Negative for decreased urine volume, difficulty urinating, dysuria, enuresis, flank pain, frequency, genital sores, hematuria and urgency.   Musculoskeletal: Positive for myalgias. Negative for arthralgias, back pain, gait problem, joint swelling, neck pain and neck stiffness.   Skin: Negative for color change, pallor, rash and wound.   Allergic/Immunologic: Negative for environmental allergies, food allergies and immunocompromised state.   Neurological: Negative for dizziness, tremors, seizures, syncope, facial asymmetry, speech difficulty, weakness, light-headedness, numbness and headaches.   Hematological: Negative for adenopathy. Does not bruise/bleed easily.   Psychiatric/Behavioral: Negative for agitation, behavioral problems, confusion, decreased concentration, dysphoric mood, hallucinations, self-injury, sleep disturbance and suicidal ideas. The patient is not nervous/anxious and is not hyperactive.    All other systems reviewed and are negative.        Objective    reports that she quit smoking about 20 years ago. she has never used smokeless tobacco. She reports that she does not drink alcohol or use drugs.   SMOKING STATUS: Nonsmoker    Physical Exam:   Vitals:/68   Temp 98 °F (36.7 °C) (Temporal)   Ht 149.9 cm (59.02\")   Wt 83.5 kg (184 lb)   BMI 37.14 kg/m²    BMI: Body mass index is 37.14 kg/m².     GENERAL:   The patient is in no acute distress, and is able to answer all questions appropriately.  Skin:  The incision is well-healed and well approximated.  No signs of infection, bleeding, or erythema.  Musculoskeletal: The patient’s strength is intact in upper and lower extremities upon direct testing.  " Dorsiflexion and plantarflexion are equal bilaterally @ 5/5. Hip flexion against resistance.  The patient’s gait is normal without antalgia.  Neurologic: The patient is alert and oriented by 3.  Recent memory, language, attention span, and fund of knowledge are all with within normal limits.   Sensation is equal bilaterally with no deficit.    Reflexes are 2+ at the patellar and Achilles tendon bilaterally.      Assessment/Plan   Independent Review of Radiographic Studies:    No new films reviewed at this visit  Medical Decision Making:      Patient is doing very well at this point.  Patient is pleased postsurgical outcome.  Incision is clean, dry.  No signs of infection, bleeding, or erythema.    The patient will follow-up in 4 weeks after completing physical therapy. The patient understands instructions and limitations for the best post-operative success.    It has been a pleasure providing neurosurgical care.    MRAY Johnson was seen today for post-op and suture / staple removal.    Diagnoses and all orders for this visit:    S/P hemilaminotomy  Comments:  LUMBAR HEMILAMINECTOMY L2-5 LEFT  KYPHOPLASTY T12 on 11/02/18      No Follow-up on file.

## 2018-12-17 ENCOUNTER — OFFICE VISIT (OUTPATIENT)
Dept: NEUROSURGERY | Facility: CLINIC | Age: 73
End: 2018-12-17

## 2018-12-17 VITALS
HEIGHT: 59 IN | DIASTOLIC BLOOD PRESSURE: 68 MMHG | WEIGHT: 184 LBS | BODY MASS INDEX: 37.09 KG/M2 | SYSTOLIC BLOOD PRESSURE: 130 MMHG | TEMPERATURE: 98.2 F

## 2018-12-17 DIAGNOSIS — M51.26 HNP (HERNIATED NUCLEUS PULPOSUS), LUMBAR: ICD-10-CM

## 2018-12-17 DIAGNOSIS — Z98.890 S/P HEMILAMINOTOMY: Primary | ICD-10-CM

## 2018-12-17 DIAGNOSIS — M48.062 SPINAL STENOSIS OF LUMBAR REGION WITH NEUROGENIC CLAUDICATION: ICD-10-CM

## 2018-12-17 DIAGNOSIS — S22.080A T12 COMPRESSION FRACTURE (HCC): ICD-10-CM

## 2018-12-17 DIAGNOSIS — M51.36 BULGING LUMBAR DISC: ICD-10-CM

## 2018-12-17 DIAGNOSIS — M51.36 DDD (DEGENERATIVE DISC DISEASE), LUMBAR: ICD-10-CM

## 2018-12-17 DIAGNOSIS — G95.19 NEUROGENIC CLAUDICATION: ICD-10-CM

## 2018-12-17 PROCEDURE — 99024 POSTOP FOLLOW-UP VISIT: CPT | Performed by: PHYSICIAN ASSISTANT

## 2018-12-17 RX ORDER — TRAMADOL HYDROCHLORIDE 50 MG/1
TABLET ORAL
COMMUNITY
Start: 2018-12-05 | End: 2020-07-14

## 2018-12-17 NOTE — PROGRESS NOTES
Subjective   Patient ID: Amy Powell is a 73 y.o. female is here today for follow-up for wound check.    HPI:      She is well-known to the neurosurgical practice for undergoing a fall about 3 months ago because some abdominal discomfort and left leg sciatica symptoms.  Patient was deemed surgical candidate by Dr. Carlos Alcazar.  Patient underwent a T12 kyphoplasty as well as a unilateral laminectomy from L2-5.    At last visit patient was 85-90% better but still had orbital left leg sciatica.  Patient states that since the 2 PT visits she is done she has had increased leg pain.  She is somewhat tearful today.  Patient is not careful due to pain she is tearful due to the burden of taking care of her .  He unfortunately lost his left lower extremity is making it very difficult for him to get around and is difficult for her to maintain the house that she is in.  We had a very galo conversation about the possibility of assisted living.  Patient is walking upright and doing that her than before surgery but continues to have some pain.    I discussed with her that I'll like to see her back in 6 more weeks to see how she is doing.  She should avoid exacerbating exercises and try to maintain a walking program.  I told her that it is imperative for her to take care of herself first, and then help out with her .  She states that they have a very close family that tries to help as much they can but still very difficult for her to manage all the chores.    The following portions of the patient's history were reviewed and updated as appropriate: allergies, current medications, past family history, past medical history, past social history, past surgical history and problem list.    Review of Systems   Constitutional: Negative for activity change, appetite change, chills, diaphoresis, fatigue, fever and unexpected weight change.   HENT: Negative for congestion, dental problem, drooling, ear discharge, ear  "pain, facial swelling, hearing loss, mouth sores, nosebleeds, postnasal drip, rhinorrhea, sinus pressure, sneezing, sore throat, tinnitus, trouble swallowing and voice change.    Eyes: Negative for photophobia, pain, discharge, redness, itching and visual disturbance.   Respiratory: Negative for apnea, cough, choking, chest tightness, shortness of breath, wheezing and stridor.    Cardiovascular: Positive for leg swelling. Negative for chest pain and palpitations.   Gastrointestinal: Positive for abdominal pain and nausea. Negative for abdominal distention, anal bleeding, blood in stool, constipation, diarrhea, rectal pain and vomiting.   Genitourinary: Positive for pelvic pain.   Musculoskeletal: Positive for arthralgias and myalgias. Negative for back pain, gait problem, joint swelling, neck pain and neck stiffness.   Skin: Negative for color change, pallor, rash and wound.   Allergic/Immunologic: Negative for environmental allergies, food allergies and immunocompromised state.   Neurological: Negative for dizziness, tremors, seizures, syncope, facial asymmetry, speech difficulty, weakness, light-headedness, numbness and headaches.   Hematological: Negative for adenopathy. Does not bruise/bleed easily.   Psychiatric/Behavioral: Negative for agitation, behavioral problems, confusion, decreased concentration, dysphoric mood, self-injury, sleep disturbance and suicidal ideas. The patient is not nervous/anxious and is not hyperactive.          Objective    reports that she quit smoking about 20 years ago. she has never used smokeless tobacco. She reports that she does not drink alcohol or use drugs.   SMOKING STATUS: Nonsmoker    Physical Exam:   Vitals:/68   Temp 98.2 °F (36.8 °C)   Ht 149.9 cm (59\")   Wt 83.5 kg (184 lb)   BMI 37.16 kg/m²    BMI: Body mass index is 37.16 kg/m².     GENERAL:   The patient is in no acute distress, and is able to answer all questions appropriately.  Skin:  The incision is " well-healed and well approximated.  No signs of infection, bleeding, or erythema.  Musculoskeletal:     strength is 5 out of 5 bilaterally.   Shoulder abduction is 5 out of 5.    Dorsiflexion is 5/5 Bilaterally  Plantarflexion is 5/5 bilaterally  Hip Flexion 5/5 bilaterally.    The patient’s gait is normal without antalgia.  Neurologic:   The patient is alert and oriented by 3.     Sensation is equal bilaterally with no deficit.      Reflexes:  2+ @ biceps, triceps, brachioradialis, as well as the patellar and Achilles tendon bilaterally.      Assessment/Plan   Independent Review of Radiographic Studies:    No new films reviewed at this visit  Medical Decision Making:    Patient is doing very well at this point.  Patient is pleased postsurgical outcome.  Incision is clean, dry.  No signs of infection, bleeding, or erythema.    The patient will follow-up in 6 weeks. The patient understands instructions and limitations for the best post-operative success.    It has been a pleasure providing neurosurgical care.    Nathan Cabral PA-C    There are no diagnoses linked to this encounter.  No Follow-up on file.

## 2019-01-28 ENCOUNTER — OFFICE VISIT (OUTPATIENT)
Dept: NEUROSURGERY | Facility: CLINIC | Age: 74
End: 2019-01-28

## 2019-01-28 VITALS
BODY MASS INDEX: 37.09 KG/M2 | SYSTOLIC BLOOD PRESSURE: 124 MMHG | DIASTOLIC BLOOD PRESSURE: 60 MMHG | TEMPERATURE: 97.8 F | HEIGHT: 59 IN | WEIGHT: 184 LBS

## 2019-01-28 DIAGNOSIS — M48.062 SPINAL STENOSIS, LUMBAR REGION, WITH NEUROGENIC CLAUDICATION: Primary | ICD-10-CM

## 2019-01-28 PROCEDURE — 99024 POSTOP FOLLOW-UP VISIT: CPT | Performed by: PHYSICIAN ASSISTANT

## 2019-01-28 RX ORDER — ALENDRONATE SODIUM 70 MG/1
TABLET ORAL
COMMUNITY

## 2019-01-28 NOTE — PROGRESS NOTES
Patient: Amy Powell  : 1945    Primary Care Provider: Keshia Noe MD      Chief Complaint: None    History of Present Illness:       Patient is here for 6 week follow-up following her last visit.  Patient was having issues with her left lower extremity and some renewed pain after over exerting herself.  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  Patient is very stressed out about this and put out a point reference visit to check on her well-being.    Patient is doing much better at this visit and has put a very reasonable boundaries with her family and this is helped her demeanor very much so.  Patient is not tearful at this visit and reports 100% resolution of her sciatica pain with only intermittent pains in her left buttock.  Patient associate this with over work.    Patient's strength is good and she is relating without a cane today.    Review of Systems   Constitutional: Negative for activity change, appetite change, chills, diaphoresis, fatigue, fever and unexpected weight change.   HENT: Positive for rhinorrhea and sore throat. Negative for congestion, dental problem, drooling, ear discharge, ear pain, facial swelling, hearing loss, mouth sores, nosebleeds, postnasal drip, sinus pressure, sinus pain, sneezing, tinnitus, trouble swallowing and voice change.    Eyes: Negative for photophobia, pain, discharge, redness, itching and visual disturbance.   Respiratory: Negative for apnea, cough, choking, chest tightness, shortness of breath, wheezing and stridor.    Cardiovascular: Positive for leg swelling. Negative for chest pain and palpitations.   Gastrointestinal: Negative for abdominal distention, abdominal pain, anal bleeding, blood in stool, constipation, diarrhea, nausea, rectal pain and vomiting.   Endocrine: Negative for cold intolerance, heat intolerance, polydipsia, polyphagia and polyuria.   Genitourinary: Negative  for decreased urine volume, difficulty urinating, dysuria, enuresis, flank pain, frequency, genital sores, hematuria and urgency.   Musculoskeletal: Positive for arthralgias, back pain and myalgias. Negative for gait problem, joint swelling, neck pain and neck stiffness.   Skin: Negative for color change, pallor, rash and wound.   Allergic/Immunologic: Negative for environmental allergies, food allergies and immunocompromised state.   Neurological: Negative for dizziness, tremors, seizures, syncope, facial asymmetry, speech difficulty, weakness, light-headedness, numbness and headaches.   Hematological: Negative for adenopathy. Does not bruise/bleed easily.   Psychiatric/Behavioral: Negative for agitation, behavioral problems, confusion, decreased concentration, dysphoric mood, hallucinations, self-injury, sleep disturbance and suicidal ideas. The patient is not nervous/anxious and is not hyperactive.        Past Medical History:     Past Medical History:   Diagnosis Date   • Arthritis    • Asthma    • Cardiomyopathy (CMS/[facility])     2017 TTE 35-40% EF   • Coronary artery disease    • Diabetes mellitus (CMS/[facility])    • Disease of thyroid gland    • DVT (deep vein thrombosis) in pregnancy (CMS/[facility])    • DVT of lower limb, acute (CMS/[facility]) 1977; 2004    X 2 LEFT LEG   • Frequent urination     since fall    • H/O Prinzmetal angina    • Heart attack (CMS/[facility]) 1996/ 1998    x2   • History of pneumonia    • History of transfusion    • Psoriasis    • Stroke (CMS/[facility]) 1996   • Ulcerative colitis (CMS/[facility])    • Urinary incontinence     after fall   • Wears dentures        Family History:     Family History   Problem Relation Age of Onset   • Breast cancer Sister 30   • Ovarian cancer Sister    • Cancer Sister    • Cancer Mother    • No Known Problems Father        Social History:    reports that she quit smoking about 21 years ago. she has never used smokeless tobacco. She reports that she does not drink alcohol or use  "drugs.   SMOKING STATUS: Nonsmoker    Surgical History:     Past Surgical History:   Procedure Laterality Date   • APPENDECTOMY     • CARDIAC CATHETERIZATION     • CARPAL TUNNEL RELEASE     • CHOLECYSTECTOMY     • HYSTERECTOMY      AGE 54   • HYSTERECTOMY     • KYPHOPLASTY N/A 11/2/2018    Procedure: KYPHOPLASTY T12;  Surgeon: Carlos Alcazar MD;  Location: [facility] OR;  Service: Neurosurgery   • LUMBAR DISCECTOMY Left 11/2/2018    Procedure: LUMBAR HEMILAMINECTOMY L2-5 LEFT;  Surgeon: Carlos Alcazar MD;  Location: [facility] OR;  Service: Neurosurgery   • OOPHORECTOMY         Allergies:   Atorvastatin; Norvasc  [amlodipine besylate]; and Insulin nph isophane & regular    Physical Exam:    Vital Signs:/60 (BP Location: Right arm, Patient Position: Sitting)   Temp 97.8 °F (36.6 °C) (Temporal)   Ht 149.9 cm (59.02\")   Wt 83.5 kg (184 lb)   BMI 37.14 kg/m²    BMI: Body mass index is 37.14 kg/m².    GENERAL:   The patient is in no acute distress, and is able to answer all questions appropriately.  Skin:  The incision is well-healed and well approximated.  No signs of infection, bleeding, or erythema.  Musculoskeletal:     strength is 5 out of 5 bilaterally.   Shoulder abduction is 5 out of 5.    Dorsiflexion is 5/5 Bilaterally  Plantarflexion is 5/5 bilaterally  Hip Flexion 5/5 bilaterally.    The patient’s gait is normal without antalgia.  Neurologic:   The patient is alert and oriented by 3.     Sensation is equal bilaterally with no deficit.      Reflexes:  2+ @ biceps, triceps, brachioradialis, as well as the patellar and Achilles tendon bilaterally.      Medical Decision Making    Data Review:   No new films reviewed at this visit    Diagnosis:   Postop lumbar laminectomy    Treatment Options:   Patient is doing much better at this visit and I spirits.  Patient will follow up with us on an as-needed basis.  Dr. Alcazar was able to come in and speak with the patient at the end of the visit.  " Patient is very grateful for that.    It has been a pleasure providing neurosurgical care.    Nathan Cabral PA-C      No diagnosis found.

## 2019-07-18 ENCOUNTER — TRANSCRIBE ORDERS (OUTPATIENT)
Dept: ADMINISTRATIVE | Facility: HOSPITAL | Age: 74
End: 2019-07-18

## 2019-07-18 DIAGNOSIS — Z12.31 VISIT FOR SCREENING MAMMOGRAM: Primary | ICD-10-CM

## 2019-09-03 ENCOUNTER — HOSPITAL ENCOUNTER (OUTPATIENT)
Dept: MAMMOGRAPHY | Facility: HOSPITAL | Age: 74
Discharge: HOME | End: 2019-09-03
Admitting: INTERNAL MEDICINE

## 2019-09-03 DIAGNOSIS — Z12.31 VISIT FOR SCREENING MAMMOGRAM: ICD-10-CM

## 2019-09-03 PROCEDURE — 77063 BREAST TOMOSYNTHESIS BI: CPT | Performed by: RADIOLOGY

## 2019-09-03 PROCEDURE — 77063 BREAST TOMOSYNTHESIS BI: CPT

## 2019-09-03 PROCEDURE — 77067 SCR MAMMO BI INCL CAD: CPT

## 2019-09-03 PROCEDURE — 77067 SCR MAMMO BI INCL CAD: CPT | Performed by: RADIOLOGY

## 2020-03-11 ENCOUNTER — HOSPITAL ENCOUNTER (EMERGENCY)
Facility: HOSPITAL | Age: 75
Discharge: HOME | End: 2020-03-11
Attending: EMERGENCY MEDICINE | Admitting: EMERGENCY MEDICINE

## 2020-03-11 ENCOUNTER — APPOINTMENT (OUTPATIENT)
Dept: GENERAL RADIOLOGY | Facility: HOSPITAL | Age: 75
End: 2020-03-11

## 2020-03-11 VITALS
SYSTOLIC BLOOD PRESSURE: 150 MMHG | TEMPERATURE: 98.5 F | OXYGEN SATURATION: 97 % | RESPIRATION RATE: 18 BRPM | WEIGHT: 182 LBS | HEART RATE: 83 BPM | DIASTOLIC BLOOD PRESSURE: 69 MMHG | BODY MASS INDEX: 36.69 KG/M2 | HEIGHT: 59 IN

## 2020-03-11 DIAGNOSIS — S42.295A OTHER CLOSED NONDISPLACED FRACTURE OF PROXIMAL END OF LEFT HUMERUS, INITIAL ENCOUNTER: Primary | ICD-10-CM

## 2020-03-11 PROCEDURE — 63710000001 ONDANSETRON ODT 4 MG TABLET DISPERSIBLE: Performed by: EMERGENCY MEDICINE

## 2020-03-11 PROCEDURE — 99283 EMERGENCY DEPT VISIT LOW MDM: CPT

## 2020-03-11 PROCEDURE — 73060 X-RAY EXAM OF HUMERUS: CPT

## 2020-03-11 RX ORDER — HYDROCODONE BITARTRATE AND ACETAMINOPHEN 5; 325 MG/1; MG/1
1 TABLET ORAL ONCE
Status: COMPLETED | OUTPATIENT
Start: 2020-03-11 | End: 2020-03-11

## 2020-03-11 RX ORDER — ONDANSETRON 4 MG/1
4 TABLET, ORALLY DISINTEGRATING ORAL ONCE
Status: COMPLETED | OUTPATIENT
Start: 2020-03-11 | End: 2020-03-11

## 2020-03-11 RX ORDER — ONDANSETRON 4 MG/1
4 TABLET, ORALLY DISINTEGRATING ORAL EVERY 8 HOURS PRN
Qty: 12 TABLET | Refills: 0 | Status: SHIPPED | OUTPATIENT
Start: 2020-03-11

## 2020-03-11 RX ORDER — HYDROCODONE BITARTRATE AND ACETAMINOPHEN 5; 325 MG/1; MG/1
1 TABLET ORAL EVERY 6 HOURS PRN
Qty: 12 TABLET | Refills: 0 | Status: SHIPPED | OUTPATIENT
Start: 2020-03-11 | End: 2020-07-14

## 2020-03-11 RX ADMIN — ONDANSETRON 4 MG: 4 TABLET, ORALLY DISINTEGRATING ORAL at 16:58

## 2020-03-11 RX ADMIN — HYDROCODONE BITARTRATE AND ACETAMINOPHEN 1 TABLET: 5; 325 TABLET ORAL at 17:05

## 2020-03-11 NOTE — ED PROVIDER NOTES
"Subjective   Ms. Powell [place] [employer] [place], she was walking backwards tripped over a bag and landed on her left upper extremity.  She states she has no numbness or tingling no weakness but\" bit of pain.  She is unable to move the arm due to the pain.  Did not hit her neck did strike her head did not lose consciousness states that she is not any blood thinners.  She does not think the head injury is significant does not want evaluation for it.  She does not have an orthopedist.  She is right-hand dominant.  She has no other complaints.      History provided by:  Patient   used: No    Arm Injury   Location:  Shoulder  Shoulder location:  L shoulder  Injury: yes    Time since incident:  2 hours  Mechanism of injury: fall    Fall:     Impact surface:  Hard floor    Point of impact:  Outstretched arms and head    Entrapped after fall: no    Pain details:     Quality:  Sharp    Radiates to:  Does not radiate    Severity:  Moderate    Onset quality:  Sudden    Timing:  Constant  Handedness:  Right-handed  Dislocation: no    Foreign body present:  No foreign bodies  Tetanus status:  Unknown  Prior injury to area:  No  Relieved by:  Immobilization  Worsened by:  Movement and exercise  Ineffective treatments:  Immobilization  Associated symptoms: decreased range of motion and stiffness    Associated symptoms: no back pain and no fever        Review of Systems   Constitutional: Negative for fever.   HENT: Negative for congestion, facial swelling, nosebleeds, postnasal drip and rhinorrhea.    Respiratory: Negative for cough, chest tightness and wheezing.    Gastrointestinal: Negative for abdominal pain, diarrhea and nausea.   Musculoskeletal: Positive for stiffness. Negative for back pain.   Psychiatric/Behavioral: Negative.    All other systems reviewed and are negative.      Past Medical History:   Diagnosis Date   • Arthritis    • Asthma    • Cardiomyopathy (CMS/H[facility])     " 2017 TTE 35-40% EF   • Coronary artery disease    • Diabetes mellitus (CMS/[facility])    • Disease of thyroid gland    • DVT (deep vein thrombosis) in pregnancy (CMS/HCC)    • DVT of lower limb, acute (CMS/HCC) ; 2004    X 2 LEFT LEG   • Frequent urination     since fall    • H/O Prinzmetal angina    • Heart attack (CMS/HCC) 1996/ 1998    x2   • History of pneumonia    • History of transfusion    • Psoriasis    • Stroke (CMS/[facility])    • Ulcerative colitis (CMS/[facility])    • Urinary incontinence     after fall   • Wears dentures        Allergies   Allergen Reactions   • Atorvastatin Hives   • Norvasc  [Amlodipine Besylate] Other (See Comments)   • Insulin Nph Isophane & Regular Rash     (NOVOLIN)       Past Surgical History:   Procedure Laterality Date   • APPENDECTOMY     • BREAST EXCISIONAL BIOPSY Left    • CARDIAC CATHETERIZATION     • CARPAL TUNNEL RELEASE     • CHOLECYSTECTOMY     • HYSTERECTOMY      AGE 54   • KYPHOPLASTY N/A 2018    Procedure: KYPHOPLASTY T12;  Surgeon: Carlos Alcazar MD;  Location:  [facility] OR;  Service: Neurosurgery   • LUMBAR DISCECTOMY Left 2018    Procedure: LUMBAR HEMILAMINECTOMY L2-5 LEFT;  Surgeon: Carlos Alcazar MD;  Location:  [facility] OR;  Service: Neurosurgery   • OOPHORECTOMY Bilateral     AGE 54       Family History   Problem Relation Age of Onset   • Breast cancer Sister 30   • Ovarian cancer Sister 17   • Cancer Mother    • No Known Problems Father    • Breast cancer Maternal Grandmother         DX AGE UNKNOWN       Social History     Socioeconomic History   • Marital status:      Spouse name: Not on file   • Number of children: Not on file   • Years of education: Not on file   • Highest education level: Not on file   Tobacco Use   • Smoking status: Former Smoker     Last attempt to quit:      Years since quittin.2   • Smokeless tobacco: Never Used   Substance and Sexual Activity   • Alcohol use: No   • Drug use: No   • Sexual activity: Defer            Objective   Physical Exam   Constitutional: She is oriented to person, place, and time. She appears well-developed and well-nourished. No distress.   HENT:   Head: Normocephalic and atraumatic.   Right Ear: External ear normal.   Left Ear: External ear normal.   Nose: Nose normal.   Mouth/Throat: Oropharynx is clear and moist.   No evidence of trauma to the head.   Eyes: Conjunctivae are normal. No scleral icterus.   Neck: Normal range of motion. Neck supple.   Cardiovascular: Normal rate, regular rhythm, normal heart sounds and intact distal pulses.   No murmur heard.  Pulmonary/Chest: Effort normal and breath sounds normal. No respiratory distress.   Abdominal: Soft. Bowel sounds are normal. There is no tenderness.   Musculoskeletal:   Tender the left proximal humerus.  There is no open wounds.  No ecchymosis.  Distal pulses are strong and equal sensation was intact.  No numbness over the deltoid.   Neurological: She is alert and oriented to person, place, and time.   Skin: Skin is warm and dry. She is not diaphoretic.   Psychiatric: She has a normal mood and affect. Her behavior is normal.   Nursing note and vitals reviewed.      Procedures           ED Course            No results found for this or any previous visit (from the past 24 hour(s)).  Note: In addition to lab results from this visit, the labs listed above may include labs taken at another facility or during a different encounter within the last 24 hours. Please correlate lab times with ED admission and discharge times for further clarification of the services performed during this visit.    XR Humerus Left   Final Result   Acute fracture of the surgical neck left humerus with   cortical step-off however no significant angulation or displacement.       DICTATED:   03/11/2020   EDITED/ls :   03/11/2020        This report was finalized on 3/11/2020 5:32 PM by Dr. Florentin Levin.            Vitals:    03/11/20 1427 03/11/20 1715   BP: (!) 163/101  "150/69   Pulse: 93 83   Resp: 18    Temp: 98.5 °F (36.9 °C)    SpO2: 96% 97%   Weight: 82.6 kg (182 lb)    Height: 149.9 cm (59\")      Medications   HYDROcodone-acetaminophen (NORCO) 5-325 MG per tablet 1 tablet (1 tablet Oral Given 3/11/20 1705)   ondansetron ODT (ZOFRAN-ODT) disintegrating tablet 4 mg (4 mg Oral Given 3/11/20 1658)     ECG/EMG Results (last 24 hours)     ** No results found for the last 24 hours. **        No orders to display               No results found for this or any previous visit (from the past 24 hour(s)).  Note: In addition to lab results from this visit, the labs listed above may include labs taken at another facility or during a different encounter within the last 24 hours. Please correlate lab times with ED admission and discharge times for further clarification of the services performed during this visit.    XR Humerus Left   Final Result   Acute fracture of the surgical neck left humerus with   cortical step-off however no significant angulation or displacement.       DICTATED:   03/11/2020   EDITED/ls :   03/11/2020        This report was finalized on 3/11/2020 5:32 PM by Dr. Florentin Levin.            Vitals:    03/11/20 1427 03/11/20 1715   BP: (!) 163/101 150/69   Pulse: 93 83   Resp: 18    Temp: 98.5 °F (36.9 °C)    SpO2: 96% 97%   Weight: 82.6 kg (182 lb)    Height: 149.9 cm (59\")      Medications   HYDROcodone-acetaminophen (NORCO) 5-325 MG per tablet 1 tablet (1 tablet Oral Given 3/11/20 1705)   ondansetron ODT (ZOFRAN-ODT) disintegrating tablet 4 mg (4 mg Oral Given 3/11/20 1658)     ECG/EMG Results (last 24 hours)     ** No results found for the last 24 hours. **        No orders to display                                    MDM      Final diagnoses:   Other closed nondisplaced fracture of proximal end of left humerus, initial encounter            Geraldo Mclean PA  03/16/20 0757    "

## 2020-06-29 ENCOUNTER — OFFICE VISIT (OUTPATIENT)
Dept: NEUROSURGERY | Facility: CLINIC | Age: 75
End: 2020-06-29

## 2020-06-29 VITALS
BODY MASS INDEX: 35.68 KG/M2 | TEMPERATURE: 97.5 F | WEIGHT: 177 LBS | SYSTOLIC BLOOD PRESSURE: 144 MMHG | DIASTOLIC BLOOD PRESSURE: 90 MMHG | HEIGHT: 59 IN

## 2020-06-29 DIAGNOSIS — M48.062 SPINAL STENOSIS, LUMBAR REGION, WITH NEUROGENIC CLAUDICATION: Primary | ICD-10-CM

## 2020-06-29 DIAGNOSIS — M25.552 HIP PAIN, ACUTE, LEFT: ICD-10-CM

## 2020-06-29 PROCEDURE — 99214 OFFICE O/P EST MOD 30 MIN: CPT | Performed by: PHYSICIAN ASSISTANT

## 2020-06-29 NOTE — PROGRESS NOTES
Patient: Amy Powell  : 1945    Primary Care Provider: Keshia Noe MD      Chief Complaint: Anterior left thigh pain    History of Present Illness:       Patient is a very sweet 74-year-old female who has a extended history with us.  Patient has had a hemilaminectomy of L2-L5 as well as T12 kyphoplasty.  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    Since the lumbar laminectomy in 2018 she has been pain-free and been very happy with her results.  Patient tripped over a bowling bag in March broke her left arm and banged up the left side of her body pretty badly.    Patient had been doing fairly well with this until the middle of May when she started noticing pain in the anterior part of her left leg.  This would go from her groin down just below her knee.    My initial inclination was return of sciatica type pain and/or a lumbar fracture but upon further questioning this is worse in the morning and with activity.    Patient also complains of pain with range of motion of the left leg.    Upon examination and noticed that she does have a fairly exquisite pain in the left groin on internal rotation of the left hip.    Patient has had x-rays with another doctor that gives her injections in her knees but does not look like she has had specific work-up for her left hip.  She does have x-rays of the left hip that showed no bone-on-bone deformity but this does not rule out any labral tears or soft tissue damage.     Review of Systems   Constitutional: Positive for activity change, appetite change and fatigue. Negative for chills, diaphoresis, fever and unexpected weight change.   HENT: Negative for congestion, dental problem, drooling, ear discharge, ear pain, facial swelling, hearing loss, mouth sores, nosebleeds, postnasal drip, rhinorrhea, sinus pressure, sneezing, sore throat, tinnitus, trouble swallowing and voice change.    Eyes: Negative for  photophobia, pain, discharge, redness, itching and visual disturbance.   Respiratory: Negative for apnea, cough, choking, chest tightness, shortness of breath, wheezing and stridor.    Cardiovascular: Positive for leg swelling. Negative for chest pain and palpitations.   Gastrointestinal: Positive for nausea. Negative for abdominal distention, abdominal pain, anal bleeding, blood in stool, constipation, diarrhea, rectal pain and vomiting.   Endocrine: Negative for cold intolerance, heat intolerance, polydipsia, polyphagia and polyuria.   Genitourinary: Positive for flank pain. Negative for decreased urine volume, difficulty urinating, dysuria, enuresis, frequency, genital sores, hematuria and urgency.   Musculoskeletal: Positive for arthralgias, back pain and gait problem. Negative for joint swelling, myalgias, neck pain and neck stiffness.   Skin: Positive for color change. Negative for pallor, rash and wound.   Allergic/Immunologic: Negative for environmental allergies, food allergies and immunocompromised state.   Neurological: Positive for weakness, light-headedness and numbness. Negative for dizziness, tremors, seizures, syncope, facial asymmetry, speech difficulty and headaches.   Hematological: Negative for adenopathy. Bruises/bleeds easily.   Psychiatric/Behavioral: Negative for agitation, behavioral problems, confusion, decreased concentration, dysphoric mood, hallucinations, self-injury, sleep disturbance and suicidal ideas. The patient is not nervous/anxious and is not hyperactive.        Past Medical History:     Past Medical History:   Diagnosis Date   • Arthritis    • Asthma    • Cardiomyopathy (CMS/HCC)     2017 TTE 35-40% EF   • Coronary artery disease    • Diabetes mellitus (CMS/[facility])    • Disease of thyroid gland    • DVT (deep vein thrombosis) in pregnancy    • DVT of lower limb, acute (CMS/HCC) 1977; 2004    X 2 LEFT LEG   • Frequent urination     since fall    • H/O Prinzmetal angina    • Heart  "attack (CMS/[facility]) 1996/ 1998    x2   • History of pneumonia    • History of transfusion    • Psoriasis    • Stroke (CMS/[facility]) 1996   • Ulcerative colitis (CMS/[facility])    • Urinary incontinence     after fall   • Wears dentures        Family History:     Family History   Problem Relation Age of Onset   • Breast cancer Sister 30   • Ovarian cancer Sister 17   • Cancer Mother    • No Known Problems Father    • Breast cancer Maternal Grandmother         DX AGE UNKNOWN       Social History:    reports that she quit smoking about 22 years ago. She has never used smokeless tobacco. She reports that she does not drink alcohol or use drugs.   SMOKING STATUS: Non-smoker    Surgical History:     Past Surgical History:   Procedure Laterality Date   • APPENDECTOMY     • BREAST EXCISIONAL BIOPSY Left 1981   • CARDIAC CATHETERIZATION     • CARPAL TUNNEL RELEASE     • CHOLECYSTECTOMY     • HYSTERECTOMY      AGE 54   • KYPHOPLASTY N/A 11/2/2018    Procedure: KYPHOPLASTY T12;  Surgeon: Carlos Alcazar MD;  Location: [facility] OR;  Service: Neurosurgery   • LUMBAR DISCECTOMY Left 11/2/2018    Procedure: LUMBAR HEMILAMINECTOMY L2-5 LEFT;  Surgeon: Carlos Alcazar MD;  Location: [facility] OR;  Service: Neurosurgery   • OOPHORECTOMY Bilateral     AGE 54       Allergies:   Atorvastatin; Norvasc  [amlodipine besylate]; and Insulin nph isophane & regular    Physical Exam:    Vital Signs:/90 (BP Location: Right arm, Patient Position: Sitting)   Temp 97.5 °F (36.4 °C) (Temporal)   Ht 149.9 cm (59\")   Wt 80.3 kg (177 lb)   BMI 35.75 kg/m²    BMI: Body mass index is 35.75 kg/m².     GENERAL:           The patient is in no acute distress, and is able to answer all questions appropriately.    Neck:          Supple without lymphadenopathy    Musculoskeletal:            strength is 5 out of 5 bilaterally.        Shoulder abduction is 5 out of 5.         Dorsiflexion is 5/5 Bilaterally       Plantarflexion is 5/5 bilaterally       Hip " Flexion 5/5 bilaterally.         Pain with internal rotation of the left hip    Neurologic:          The patient is alert and oriented by 3.          Pupils are equal and reactive to light.         Visual fields are full.         Extraocular movements are intact without nystagmus.         There is no evidence of central motor drift. No facial droop.  No difficulty with rapid alternating movements.         Sensation is equal bilaterally with no deficit.           Reflexes:  2+ through out    Medical Decision Making    Data Review:   No new films reviewed at this visit    Diagnosis:   Lumbar spinal stenosis   Status post T12 kyphoplasty and hemilaminectomy of L2-5  Left intrinsic hip dysfunction    Treatment Options:   I think patient is doing very well and has been fairly pain-free from lumbar stenosis standpoint for close to 2 years.  After the fall she started noticing pain in her left anterior thigh and may.  She has been through some physical therapy for her left humerus that was fractured as well is for her left hip.    Patient has had evaluation that she deems fairly subpar for her left hip and I discussed with her with her present symptoms being mainly exacerbated by range of motion and internal rotation of the left hip I do not think that this is related to her lumbar spine but I cannot 100% rule this out.  But based off of her symptoms I think that the most appropriate work-up would be of evaluation of the left hip possible MRI but I will leave this up to Dr. Douglass who we sent a lot of patients to.    If the orthopedist does not think that this is her hip I think it is reasonable that we get a flexion and extension x-ray of the lumbar spine as well as a lumbar spine MRI with and without contrast to check for compression but I do not necessarily think that this is the source of her pain.    It has been a pleasure providing neurosurgical care.    Nathan Cabral PA-C    Patient's Body mass index is 35.75  kg/m². BMI is above normal parameters. Recommendations include: nutrition counseling.     Diagnosis Plan   1. Spinal stenosis, lumbar region, with neurogenic claudication  Ambulatory Referral to Orthopedic Surgery   2. Hip pain, acute, left  Ambulatory Referral to Orthopedic Surgery

## 2020-07-14 ENCOUNTER — OFFICE VISIT (OUTPATIENT)
Dept: ORTHOPEDIC SURGERY | Facility: CLINIC | Age: 75
End: 2020-07-14

## 2020-07-14 VITALS — BODY MASS INDEX: 35.76 KG/M2 | WEIGHT: 177.03 LBS | OXYGEN SATURATION: 100 % | HEART RATE: 95 BPM

## 2020-07-14 DIAGNOSIS — M25.552 LEFT HIP PAIN: ICD-10-CM

## 2020-07-14 DIAGNOSIS — M16.12 PRIMARY OSTEOARTHRITIS OF LEFT HIP: Primary | ICD-10-CM

## 2020-07-14 PROCEDURE — 99204 OFFICE O/P NEW MOD 45 MIN: CPT | Performed by: ORTHOPAEDIC SURGERY

## 2020-07-14 RX ORDER — ONDANSETRON HYDROCHLORIDE 8 MG/1
TABLET, FILM COATED ORAL EVERY 8 HOURS
COMMUNITY
End: 2020-07-14

## 2020-07-14 RX ORDER — MELOXICAM 15 MG/1
TABLET ORAL
Qty: 60 TABLET | Refills: 0 | Status: SHIPPED | OUTPATIENT
Start: 2020-07-14

## 2020-07-14 RX ORDER — METAXALONE 800 MG/1
TABLET ORAL
COMMUNITY
Start: 2020-06-30

## 2020-07-14 NOTE — PROGRESS NOTES
Orthopaedic Clinic Note: Hip New Patient    Chief Complaint   Patient presents with   • Left Hip - Pain        HPI    Amy Powell is a 74 y.o. female who presents with left hip pain for 4 month(s). Onset mechanical fall. Pain is localized to groin, lateral trochanter, and thigh region and radiates down the leg to the proximal tibia and is a 8/10 on the pain scale.Pain is described as aching and throbbing. Associated symptoms include pain, swelling and stiffness.  The pain is worse with walking, standing, sitting and sleeping; heat and pain medication and/or NSAID improve the pain. Previous treatments have included: Steroid injection left hip May 2020 which failed to provide any improvement in her pain.  She is also attempted treatment with Skelaxin which did provide some relief.  Although some transient relief was reported with these interventions, these conservative measures have failed and symptoms have persisted. The patient is limited in daily activities and has had a significant decrease in quality of life as a result. She denies fevers, chills, or constitutional symptoms    I have reviewed the following portions of the patient's history:History of Present Illness    Past Medical History:   Diagnosis Date   • Arthritis    • Asthma    • Cardiomyopathy (CMS/HCC)     2017 TTE 35-40% EF   • Coronary artery disease    • Diabetes mellitus (CMS/HCC)    • Disease of thyroid gland    • DVT (deep vein thrombosis) in pregnancy    • DVT of lower limb, acute (CMS/HCC) 1977; 2004    X 2 LEFT LEG   • Frequent urination     since fall    • H/O Prinzmetal angina    • Heart attack (CMS/HCC) 1996/ 1998    x2   • History of pneumonia    • History of transfusion    • Psoriasis    • Stroke (CMS/HCC) 1996   • Ulcerative colitis (CMS/HCC)    • Urinary incontinence     after fall   • Wears dentures       Past Surgical History:   Procedure Laterality Date   • APPENDECTOMY     • BREAST EXCISIONAL BIOPSY Left 1981   • CARDIAC  CATHETERIZATION     • CARPAL TUNNEL RELEASE     • CHOLECYSTECTOMY     • HYSTERECTOMY      AGE 54   • KYPHOPLASTY N/A 2018    Procedure: KYPHOPLASTY T12;  Surgeon: Carlos Alcazar MD;  Location:  [facility] OR;  Service: Neurosurgery   • LUMBAR DISCECTOMY Left 2018    Procedure: LUMBAR HEMILAMINECTOMY L2-5 LEFT;  Surgeon: Carlos Alcazar MD;  Location:  [facility] OR;  Service: Neurosurgery   • OOPHORECTOMY Bilateral     AGE 54      Family History   Problem Relation Age of Onset   • Breast cancer Sister 30   • Ovarian cancer Sister 17   • Cancer Mother    • No Known Problems Father    • Breast cancer Maternal Grandmother         DX AGE UNKNOWN     Social History     Socioeconomic History   • Marital status:      Spouse name: Not on file   • Number of children: Not on file   • Years of education: Not on file   • Highest education level: Not on file   Tobacco Use   • Smoking status: Former Smoker     Last attempt to quit:      Years since quittin.5   • Smokeless tobacco: Never Used   Substance and Sexual Activity   • Alcohol use: No   • Drug use: No   • Sexual activity: Defer      Current Outpatient Medications on File Prior to Visit   Medication Sig Dispense Refill   • alendronate (FOSAMAX) 70 MG tablet Fosamax 70 mg tablet   Take 1 tablet every week by oral route.     • carvedilol (COREG) 3.125 MG tablet Take 3.125 mg by mouth 2 (Two) Times a Day With Meals.     • diphenhydrAMINE (BENADRYL) 25 mg capsule Take 25 mg by mouth Every 6 (Six) Hours As Needed for Itching.     • furosemide (LASIX) 20 MG tablet Take 20 mg by mouth Daily.     • Insulin Lispro Prot & Lispro (75-25) 100 UNIT/ML suspension pen-injector Inject 20 Units under the skin into the appropriate area as directed Every Morning.     • Insulin Lispro Prot & Lispro (75-25) 100 UNIT/ML suspension pen-injector Inject 30 Units under the skin into the appropriate area as directed Every Evening.     • levothyroxine (SYNTHROID,  LEVOTHROID) 100 MCG tablet Take 100 mcg by mouth Daily.     • lisinopril (PRINIVIL,ZESTRIL) 2.5 MG tablet Take 2.5 mg by mouth every night at bedtime.     • metaxalone (SKELAXIN) 800 MG tablet      • nitroglycerin (NITROSTAT) 0.4 MG SL tablet      • ondansetron ODT (ZOFRAN-ODT) 4 MG disintegrating tablet Take 1 tablet by mouth Every 8 (Eight) Hours As Needed for Nausea. 12 tablet 0   • pravastatin (PRAVACHOL) 40 MG tablet Take 40 mg by mouth Every Night.     • [DISCONTINUED] HYDROcodone-acetaminophen (NORCO) 5-325 MG per tablet Take 1 tablet by mouth Every 6 (Six) Hours As Needed for Severe Pain . 12 tablet 0   • [DISCONTINUED] ondansetron (Zofran) 8 MG tablet Every 8 (Eight) Hours.     • [DISCONTINUED] traMADol (ULTRAM) 50 MG tablet        No current facility-administered medications on file prior to visit.       Allergies   Allergen Reactions   • Atorvastatin Hives   • Norvasc  [Amlodipine Besylate] Other (See Comments)   • Insulin Nph Isophane & Regular Rash     (NOVOLIN)        Review of Systems   Constitutional: Positive for activity change.   HENT: Negative.    Eyes: Negative.    Respiratory: Negative.    Cardiovascular: Positive for leg swelling.   Gastrointestinal: Positive for nausea.   Endocrine: Negative.    Genitourinary: Negative.    Musculoskeletal: Positive for arthralgias and joint swelling.   Skin: Negative.    Allergic/Immunologic: Negative.    Neurological: Negative.    Hematological: Bruises/bleeds easily.   Psychiatric/Behavioral: Negative.         The patient's Review of Systems was personally reviewed and confirmed as accurate.    The following portions of the patient's history were reviewed and updated as appropriate: allergies, current medications, past family history, past medical history, past social history, past surgical history and problem list.    Physical Exam  Pulse 95, weight 80.3 kg (177 lb 0.5 oz), SpO2 100 %, not currently breastfeeding.    Body mass index is 35.76  kg/m².    GENERAL APPEARANCE: awake, alert & oriented x 3, in no acute distress and well developed, well nourished  PSYCH: normal affect  LUNGS:  breathing nonlabored  EYES: sclera anicteric  CARDIOVASCULAR: palpable dorsalis pedis, palpable posterior tibial bilaterally. Capillary refill less than 2 seconds  EXTREMITIES: no clubbing, cyanosis  GAIT:  Normal           Right Hip Exam:  RANGE OF MOTION:   FLEXION CONTRACTURE: None   FLEXION: 110 degrees   INTERNAL ROTATION: 20 degrees at 90 degrees of flexion   EXTERNAL ROTATION: 40 degrees at 90 degrees of flexion    PAIN WITH HIP MOTION: no  PAIN WITH LOGROLL: no  STINCHFIELD TEST: negative    KNEE EXAM: full knee ROM (0-120 degrees), stable to varus and valgus stress at terminal extension and 30 degrees flexion     STRENGTH:  5/5 hip adduction, abduction, flexion. 5/5 strength knee flexion, extension. 5/5 strength ankle dorsiflexion and plantarflexion.     GREATER TROCHANTER BURSAL PAIN:  no     REFLEXES:   PATELLAR 2+/4   ACHILLES 2+/4    CLONUS: negative  STRAIGHT LEG TEST:   negative    SENSATION TO LIGHT TOUCH:  DEEP PERONEAL/SUPERFICIAL PERONEAL/SURAL/SAPHENOUS/TIBIAL:  intact    EDEMA:   no  ERYTHEMA:  no  WOUNDS/INCISIONS: no overlying skin problems.      Left Hip Exam:   RANGE OF MOTION:   FLEXION CONTRACTURE: None   FLEXION: 110 degrees   INTERNAL ROTATION: 20 degrees at 90 degrees of flexion   EXTERNAL ROTATION: 40 degrees at 90 degrees of flexion    PAIN WITH HIP MOTION: Slight pain in groin with terminal external rotation of the hip  PAIN WITH LOGROLL: no  STINCHFIELD TEST: Mildly positive    KNEE EXAM: full knee ROM (0-120 degrees), stable to varus and valgus stress at terminal extension and 30 degrees flexion     STRENGTH:  5/5 hip adduction, abduction, flexion. 5/5 strength knee flexion, extension. 5/5 strength ankle dorsiflexion and plantarflexion.     GREATER TROCHANTER BURSAL PAIN:  no     REFLEXES:   PATELLAR 2+/4   ACHILLES 2+/4    CLONUS:  negative  STRAIGHT LEG TEST:   negative    SENSATION TO LIGHT TOUCH:  DEEP PERONEAL/SUPERFICIAL PERONEAL/SURAL/SAPHENOUS/TIBIAL:  intact    EDEMA:   no  ERYTHEMA:  no  WOUNDS/INCISIONS: no overlying skin problems.      ------------------------------------------------------------------    LEG LENGTHS:  equal  _____________________________________________________  _____________________________________________________    RADIOGRAPHIC FINDINGS:   Indication: Left hip pain    Comparison: No prior xrays are available for comparison    AP pelvis, hip 2 views: Right: moderate joint space narrowing,  there are marginal osteophytes visualized at the margins of the acetabulum; Left: moderate joint space narrowing,  there are marginal osteophytes visualized at the margins of the acetabulum    Assessment/Plan:   Diagnosis Plan   1. Primary osteoarthritis of left hip  meloxicam (MOBIC) 15 MG tablet   2. Left hip pain  XR Hip With or Without Pelvis 2 - 3 View Left     I discussed with the patient that she does have left hip arthritis.  However, her clinical exam is underwhelming in regards to the fact that her range of motion is nearly full and symmetric to the contralateral side with minimal pain except for external rotation at the extreme.  Furthermore, the intra-articular cortisone injection performed in May 2020 failed to provide any pain improvement.  In addition, her radiographs demonstrate only moderate and symmetric joint space narrowing of the bilateral hips.  If her hip joint was clearly the source of her pain, I would have expected the intra-articular steroid injection to provide more significant improvement in her pain as well as her hip motion to be more clinically symptomatic on exam.  She does have a history of lumbar back pain as well but is undergone surgery for this in the past.  I do not believe her hip joint is severe enough to warrant surgical intervention, however, she is a candidate for anti-inflammatory  treatment to see if this alleviates her pain.  She is agreeable to this.  She will follow-up in 2 weeks for repeat assessment.  If her symptoms fail to improve, MRI of the hip may be warranted.    Federico Douglass MD  07/14/20  12:59

## 2020-10-30 ENCOUNTER — TRANSCRIBE ORDERS (OUTPATIENT)
Dept: ADMINISTRATIVE | Facility: HOSPITAL | Age: 75
End: 2020-10-30

## 2020-10-30 DIAGNOSIS — Z12.31 VISIT FOR SCREENING MAMMOGRAM: Primary | ICD-10-CM

## 2021-02-09 ENCOUNTER — HOSPITAL ENCOUNTER (OUTPATIENT)
Dept: MAMMOGRAPHY | Facility: HOSPITAL | Age: 76
Discharge: HOME | End: 2021-02-09
Admitting: INTERNAL MEDICINE

## 2021-02-09 DIAGNOSIS — Z12.31 VISIT FOR SCREENING MAMMOGRAM: ICD-10-CM

## 2021-02-09 PROCEDURE — 77067 SCR MAMMO BI INCL CAD: CPT

## 2021-02-09 PROCEDURE — 77063 BREAST TOMOSYNTHESIS BI: CPT | Performed by: RADIOLOGY

## 2021-02-09 PROCEDURE — 77067 SCR MAMMO BI INCL CAD: CPT | Performed by: RADIOLOGY

## 2021-02-09 PROCEDURE — 77063 BREAST TOMOSYNTHESIS BI: CPT

## (undated) DEVICE — CVR HNDL LT SURG ACCSSRY BLU STRL

## (undated) DEVICE — BONE BIOPSY DEVICE F07A TAPERED SIZE 2: Brand: MEDTRONIC REUSABLE INSTRUMENTS

## (undated) DEVICE — BONE TAMP KIT KEX152EB FF E2 15/2 OI: Brand: KYPHON EXPRESS II KYPHOPAK TRAY

## (undated) DEVICE — PK NEURO DISC 10

## (undated) DEVICE — HDRST INTUB GENTLETOUCH SLOT 7IN RT

## (undated) DEVICE — DRSNG WND GZ PAD BORDERED 4X8IN STRL

## (undated) DEVICE — PROXIMATE RH ROTATING HEAD SKIN STAPLERS (35 WIDE) CONTAINS 35 STAINLESS STEEL STAPLES: Brand: PROXIMATE

## (undated) DEVICE — C-ARM: Brand: UNBRANDED

## (undated) DEVICE — 3M™ STERI-STRIP™ REINFORCED ADHESIVE SKIN CLOSURES, R1547, 1/2 IN X 4 IN (12 MM X 100 MM), 6 STRIPS/ENVELOPE: Brand: 3M™ STERI-STRIP™

## (undated) DEVICE — GLV SURG BIOGEL LTX PF 8

## (undated) DEVICE — TOOLKIT VPT02A VP NEEDLE SET CURV 13 GA: Brand: KYPHON KURVE™ CURVED BONE FILLER DEVICE

## (undated) DEVICE — ADHS LIQ MASTISOL 2/3ML

## (undated) DEVICE — SNAP KOVER: Brand: UNBRANDED

## (undated) DEVICE — TOOL T12MH25M LEGEND 12CM 2.5MM MH 2FLT: Brand: MIDAS REX ™

## (undated) DEVICE — 3M™ TEGADERM™ IV TRANSPARENT FILM DRESSING WITH BORDER 1610: Brand: 3M™ TEGADERM™

## (undated) DEVICE — SYR CONTRL LUERLOK 10CC

## (undated) DEVICE — CEMENT CARTRIDGES CC02A CDS: Brand: KYPHON® CEMENT DELIVERY SYSTEM

## (undated) DEVICE — ELECTRD BLD EXT EDGE/INSUL 6IN

## (undated) DEVICE — COVER,LIGHT HANDLE,FLX,1/PK: Brand: MEDLINE INDUSTRIES, INC.

## (undated) DEVICE — MAGNETIC DRAPE: Brand: DEVON

## (undated) DEVICE — NDL HYPO ECLPS SFTY 25G 1 1/2IN

## (undated) DEVICE — PAD GRND REM POLYHESIVE A/ DISP

## (undated) DEVICE — PK KYPHOPLASTY 10

## (undated) DEVICE — TOOL 14MH30 LEGEND 14CM 3MM: Brand: MIDAS REX ™

## (undated) DEVICE — APPL CHLORAPREP W/TINT 26ML BLU

## (undated) DEVICE — TUBING, SUCTION, 1/4" X 10', STRAIGHT: Brand: MEDLINE

## (undated) DEVICE — ULTRACLEAN ACCESSORY ELECTRODE 6" (15.24 CM) COATED BLADE: Brand: ULTRACLEAN

## (undated) DEVICE — ENCORE® LATEX MICRO SIZE 8, STERILE LATEX POWDER-FREE SURGICAL GLOVE: Brand: ENCORE

## (undated) DEVICE — ELECTRD BLD EDGE/INSUL1P 2.4X5.1MM STRL

## (undated) DEVICE — NDL SPINE 22G 31/2IN BLK

## (undated) DEVICE — 3M™ STERI-DRAPE™ INSTRUMENT POUCH 1018: Brand: STERI-DRAPE™

## (undated) DEVICE — SPNG GZ WOVN 4X4IN 12PLY 10/BX STRL

## (undated) DEVICE — GLV SURG RAD SENSICARE SHLD PF LF SZ8 STRL

## (undated) DEVICE — CEMENT GUN AND BONE FILLER CDS3A SIZE 3: Brand: MEDTRONIC REUSABLE INSTRUMENTS

## (undated) DEVICE — PAD ARMBRD SURG CONVOL 7.5X20X2IN

## (undated) DEVICE — ANTIBACTERIAL UNDYED BRAIDED (POLYGLACTIN 910), SYNTHETIC ABSORBABLE SUTURE: Brand: COATED VICRYL